# Patient Record
Sex: FEMALE | Race: WHITE | Employment: FULL TIME | URBAN - METROPOLITAN AREA
[De-identification: names, ages, dates, MRNs, and addresses within clinical notes are randomized per-mention and may not be internally consistent; named-entity substitution may affect disease eponyms.]

---

## 2018-04-24 ENCOUNTER — NURSE ONLY (OUTPATIENT)
Dept: FAMILY MEDICINE CLINIC | Age: 38
End: 2018-04-24
Payer: COMMERCIAL

## 2018-04-24 DIAGNOSIS — Z23 NEED FOR VACCINATION: Primary | ICD-10-CM

## 2018-04-24 PROCEDURE — 90746 HEPB VACCINE 3 DOSE ADULT IM: CPT | Performed by: FAMILY MEDICINE

## 2018-04-24 PROCEDURE — G0010 ADMIN HEPATITIS B VACCINE: HCPCS | Performed by: FAMILY MEDICINE

## 2018-05-16 ENCOUNTER — HOSPITAL ENCOUNTER (OUTPATIENT)
Dept: AUDIOLOGY | Age: 38
Discharge: HOME | End: 2018-05-16
Payer: COMMERCIAL

## 2018-05-16 PROCEDURE — 9990000010 HC NO CHARGE VISIT

## 2020-11-25 ENCOUNTER — HOSPITAL ENCOUNTER (OUTPATIENT)
Dept: AUDIOLOGY | Age: 40
Discharge: HOME | End: 2020-11-25
Payer: COMMERCIAL

## 2020-11-25 ENCOUNTER — OFFICE VISIT (OUTPATIENT)
Dept: ENT CLINIC | Age: 40
End: 2020-11-25
Payer: COMMERCIAL

## 2020-11-25 VITALS — TEMPERATURE: 97.8 F

## 2020-11-25 PROCEDURE — G8427 DOCREV CUR MEDS BY ELIG CLIN: HCPCS | Performed by: OTOLARYNGOLOGY

## 2020-11-25 PROCEDURE — 1036F TOBACCO NON-USER: CPT | Performed by: OTOLARYNGOLOGY

## 2020-11-25 PROCEDURE — 99204 OFFICE O/P NEW MOD 45 MIN: CPT | Performed by: OTOLARYNGOLOGY

## 2020-11-25 PROCEDURE — 69210 REMOVE IMPACTED EAR WAX UNI: CPT | Performed by: OTOLARYNGOLOGY

## 2020-11-25 PROCEDURE — G8484 FLU IMMUNIZE NO ADMIN: HCPCS | Performed by: OTOLARYNGOLOGY

## 2020-11-25 PROCEDURE — G8421 BMI NOT CALCULATED: HCPCS | Performed by: OTOLARYNGOLOGY

## 2020-11-25 PROCEDURE — 9990000010 HC NO CHARGE VISIT: Performed by: AUDIOLOGIST

## 2020-11-25 ASSESSMENT — ENCOUNTER SYMPTOMS
EYE DISCHARGE: 0
ABDOMINAL PAIN: 0
RESPIRATORY NEGATIVE: 1
CHEST TIGHTNESS: 0
EYES NEGATIVE: 1
APNEA: 0
COLOR CHANGE: 0
VOMITING: 0
SHORTNESS OF BREATH: 0
DIARRHEA: 0
GASTROINTESTINAL NEGATIVE: 1
EYE PAIN: 0

## 2020-11-25 NOTE — PROGRESS NOTES
Patient was here for hearing aid check. Patient reported needing a new ear hook on the left. Changed to new ear hook and changed tubing. No tubing cement was available, but tubing felt secure. Patient also reported she needs new ear molds and requested soft, hypoallergenic material, in clear. Told patient there may be a charge for the new ear molds, we would need to check with insurance. She said that's fine. Ear mold impressions taken bilaterally without incident. Patient was satisfied and will follow up when new ear molds come in. Will order new ear hooks for patient's hearing aids, as only one was left. Referral to ENT suggested due to cerumen impaction, on the left. A video  was used for this appointment.     Jose Still [facility]  [address] [facility] [id number]  Electronically signed by Jose Still on 11/25/2020 at 11:32 AM

## 2020-11-25 NOTE — PROGRESS NOTES
Subjective:      Patient ID:  Marivel Bahena is a 37 y.o. female. HPI:    Cerumen Impaction  Patient presents with diminished hearing both ears for the past 6 months. There is a prior history of cerumen impaction. The patient was using ear drops to loosen wax immediately prior to this visit. Hearing Loss  Pt has had hearing loss since 10months of age.   Pt signs with         Past Medical History:   Diagnosis Date    Allergic rhinitis     Dr. Karla Lombardo, allergy shots q 3 weeks    Deaf     since birth, Audiology at [facility], [facility]     Past Surgical History:   Procedure Laterality Date    TONSILLECTOMY      young child     Family History   Problem Relation Age of Onset    Kidney Disease Maternal Grandmother     Heart Failure Maternal Grandfather     Cancer Paternal Cousin         brain tumor     Social History     Socioeconomic History    Marital status: Single     Spouse name: None    Number of children: None    Years of education: None    Highest education level: None   Occupational History    Occupation: [employer]   Social Needs    Financial resource strain: None    Food insecurity     Worry: None     Inability: None    Transportation needs     Medical: None     Non-medical: None   Tobacco Use    Smoking status: Never Smoker    Smokeless tobacco: Never Used   Substance and Sexual Activity    Alcohol use: No    Drug use: No    Sexual activity: Never   Lifestyle    Physical activity     Days per week: None     Minutes per session: None    Stress: None   Relationships    Social connections     Talks on phone: None     Gets together: None     Attends Sabianist service: None     Active member of club or organization: None     Attends meetings of clubs or organizations: None     Relationship status: None    Intimate partner violence     Fear of current or ex partner: None     Emotionally abused: None     Physically abused: None     Forced sexual activity: None   Other Topics Concern  None   Social History Narrative    None     Allergies   Allergen Reactions    Codeine Other (See Comments)         Review of Systems   Constitutional: Negative. Negative for appetite change. HENT: Positive for hearing loss. Eyes: Negative. Negative for pain, discharge and visual disturbance. Respiratory: Negative. Negative for apnea, chest tightness and shortness of breath. Cardiovascular: Negative. Negative for chest pain, palpitations and leg swelling. Gastrointestinal: Negative. Negative for abdominal pain, diarrhea and vomiting. Endocrine: Negative for cold intolerance, heat intolerance and polydipsia. Genitourinary: Negative. Negative for dysuria, flank pain and hematuria. Musculoskeletal: Negative. Negative for arthralgias, gait problem and neck pain. Skin: Negative. Negative for color change, pallor and rash. Allergic/Immunologic: Negative for environmental allergies, food allergies and immunocompromised state. Neurological: Negative. Negative for dizziness, numbness and headaches. Hematological: Negative for adenopathy. Psychiatric/Behavioral: Negative. Negative for behavioral problems and hallucinations. All other systems reviewed and are negative. Objective:   Physical Exam  Vitals signs and nursing note reviewed. Constitutional:       Appearance: She is well-developed. HENT:      Head: Normocephalic and atraumatic. Right Ear: Decreased hearing noted. There is impacted cerumen. Left Ear: Decreased hearing noted. There is impacted cerumen. Nose: Nose normal.      Mouth/Throat:      Pharynx: Uvula midline. Eyes:      Conjunctiva/sclera: Conjunctivae normal.      Pupils: Pupils are equal, round, and reactive to light. Neck:      Musculoskeletal: Normal range of motion and neck supple. Cardiovascular:      Rate and Rhythm: Normal rate and regular rhythm. Heart sounds: Normal heart sounds.    Pulmonary:      Effort: Pulmonary effort is normal.      Breath sounds: Normal breath sounds. Abdominal:      General: Bowel sounds are normal.      Palpations: Abdomen is soft. Skin:     General: Skin is warm and dry. Neurological:      Mental Status: She is alert and oriented to person, place, and time. Cerumenremoval     Auditory canal(s) both ears completely obstructed with cerumen. A microscope was used due to deep impaction of the cerumen. Cerumen was gently removed using soft plastic curette. Tympanic membranes are intact following the procedure. Auditory canals appear normal.                       Assessment:       Diagnosis Orders   1. Bilateral impacted cerumen     2. Congenital hearing loss of both ears                Plan:      Cerumen impaction   Will follow up with patient in 6 months unless the patient has further issues.  Discussed H2O2 andirrigation bi-weekly for maintenance      Follow up in 6 month(s)

## 2021-01-05 ENCOUNTER — HOSPITAL ENCOUNTER (OUTPATIENT)
Dept: AUDIOLOGY | Age: 41
Discharge: HOME | End: 2021-01-05
Payer: COMMERCIAL

## 2021-01-05 PROCEDURE — 92651 AEP HEARING STATUS DETER I&R: CPT | Performed by: AUDIOLOGIST

## 2021-01-05 PROCEDURE — 92653 AEP NEURODIAGNOSTIC I&R: CPT | Performed by: AUDIOLOGIST

## 2021-01-05 PROCEDURE — 92652 AEP THRSHLD EST MLT FREQ I&R: CPT | Performed by: AUDIOLOGIST

## 2021-01-05 PROCEDURE — V5264 EAR MOLD/INSERT: HCPCS | Performed by: AUDIOLOGIST

## 2021-01-05 PROCEDURE — 92650 AEP SCR AUDITORY POTENTIAL: CPT | Performed by: AUDIOLOGIST

## 2021-01-05 NOTE — PROGRESS NOTES
Patient was here to  ear molds and ear hooks. Changed ear molds, patient was satisfied with fit and sound. Gave patient one extra ear hook, at her request. Patient was satisfied and will follow up as needed. [facility] video  used for this appointment.      Jose Ortega CCC-A  [address] [facility] [id number]  Electronically signed by Jose Ortega on 1/5/2021 at 10:48 AM

## 2021-04-29 ENCOUNTER — OFFICE VISIT (OUTPATIENT)
Dept: FAMILY MEDICINE CLINIC | Age: 41
End: 2021-04-29
Payer: COMMERCIAL

## 2021-04-29 VITALS
OXYGEN SATURATION: 98 % | HEART RATE: 83 BPM | HEIGHT: 67 IN | RESPIRATION RATE: 16 BRPM | SYSTOLIC BLOOD PRESSURE: 130 MMHG | TEMPERATURE: 98.1 F | DIASTOLIC BLOOD PRESSURE: 80 MMHG | BODY MASS INDEX: 27.94 KG/M2 | WEIGHT: 178 LBS

## 2021-04-29 DIAGNOSIS — M25.531 RIGHT WRIST PAIN: ICD-10-CM

## 2021-04-29 DIAGNOSIS — M25.562 CHRONIC PAIN OF LEFT KNEE: ICD-10-CM

## 2021-04-29 DIAGNOSIS — G89.29 CHRONIC PAIN OF LEFT KNEE: ICD-10-CM

## 2021-04-29 DIAGNOSIS — B35.1 ONYCHOMYCOSIS: ICD-10-CM

## 2021-04-29 DIAGNOSIS — R10.11 RUQ PAIN: ICD-10-CM

## 2021-04-29 DIAGNOSIS — L30.9 ECZEMA, UNSPECIFIED TYPE: Primary | ICD-10-CM

## 2021-04-29 PROCEDURE — G8419 CALC BMI OUT NRM PARAM NOF/U: HCPCS | Performed by: FAMILY MEDICINE

## 2021-04-29 PROCEDURE — G8427 DOCREV CUR MEDS BY ELIG CLIN: HCPCS | Performed by: FAMILY MEDICINE

## 2021-04-29 PROCEDURE — 1036F TOBACCO NON-USER: CPT | Performed by: FAMILY MEDICINE

## 2021-04-29 PROCEDURE — 99214 OFFICE O/P EST MOD 30 MIN: CPT | Performed by: FAMILY MEDICINE

## 2021-04-29 RX ORDER — TRIAMCINOLONE ACETONIDE 1 MG/G
CREAM TOPICAL
Qty: 80 G | Refills: 1 | Status: SHIPPED
Start: 2021-04-29 | End: 2021-05-26 | Stop reason: SDUPTHER

## 2021-04-29 RX ORDER — NORETHINDRONE ACETATE/ETHINYL ESTRADIOL AND FERROUS FUMARATE 1.5-30(21)
KIT ORAL
COMMUNITY
Start: 2021-04-06

## 2021-04-29 RX ORDER — TRIAMCINOLONE ACETONIDE 1 MG/G
CREAM TOPICAL
COMMUNITY
Start: 2021-04-20

## 2021-04-29 RX ORDER — NORETHINDRONE ACETATE AND ETHINYL ESTRADIOL 1.5-30(21)
KIT ORAL
COMMUNITY
Start: 2021-04-06 | End: 2021-05-26 | Stop reason: SDUPTHER

## 2021-04-29 ASSESSMENT — PATIENT HEALTH QUESTIONNAIRE - PHQ9
SUM OF ALL RESPONSES TO PHQ QUESTIONS 1-9: 0
1. LITTLE INTEREST OR PLEASURE IN DOING THINGS: 0

## 2021-04-29 NOTE — PROGRESS NOTES
5/19/2021    Chief Complaint   Patient presents with    Rash     since march itchy might be from gloves at work        HPI    Rhett Rivera is a 41 y.o. patient that presents today for:    Dermatitis: Patient complains of a rash. Symptoms began several months ago. Patient describes the rash as lichenified. Patient's previous dermatologic history:yes. Medications currently using: none. Environmental exposures or allergies: none     Onychomycosis: Patient complains of abnormal appearing toenails. Symptoms have been ongoing for about several years, and include increasing diameter, increasing thickness, none. Previous treatment has included none, with inadequate improvement. Known liver disease? no.    Knee Pain: Patient presents with a knee injury involving the  left knee. Onset of the symptoms was several years ago. Inciting event: injured while a teenager. Current symptoms include giving out and stiffness. Pain is aggravated by going up and down stairs. Patient has had prior knee problems. Evaluation to date: none. Treatment to date: avoidance of offending activity. Wrist Pain: Patient complaints of right wrist pain. This is evaluated as a personal injury. The pain began several years ago. The pain is located primarily in the globally. She describes the symptoms as sharp. Symptoms improve with rest, wrist brace. The symptoms are worse with use of injured area. The patient  does not have neck pain. The patient is active in none. Treatment to date has been NSAID's, without significant relief. Abdominal pain:  Patient is here today with complaints of abdominal pain. This has been going on for many months . Pain is aching in nature. Pain is  radiating. Associated signs and symptoms include positive for - abdominal pain. Patient does not have a change in appetite. Patient is  drinking well. Recent travel No.  Patient does not have blood in stool. Patient has not had a colonoscopy.   Previous abdominal surgeries: No. Patient does not have genital complaints. Patient does not urinary complaints. Patient's past medical, surgical, social and/or family history reviewed, updated in chart, and are non-contributory (unless otherwise stated). Medications and allergies also reviewed and updated in chart. ROS negative unless otherwise specified    Physical Exam  Temp Readings from Last 3 Encounters:   04/29/21 98.1 °F (36.7 °C)   11/25/20 97.8 °F (36.6 °C) (Infrared)   12/27/17 98 °F (36.7 °C) (Oral)     Wt Readings from Last 3 Encounters:   04/29/21 178 lb (80.7 kg)   12/27/17 157 lb 6.4 oz (71.4 kg)   10/23/17 161 lb (73 kg)     BP Readings from Last 3 Encounters:   04/29/21 130/80   12/27/17 130/80   10/23/17 128/80     Pulse Readings from Last 3 Encounters:   04/29/21 83   12/27/17 76   10/23/17 78       General appearance: alert, well appearing, and in no distress, oriented to person, place, and time and normal appearing weight. CVS exam: normal rate, regular rhythm, normal S1, S2, no murmurs, rubs, clicks or gallops. Radial pulses 2+ bilateral.  PT/DP pulse 2+ bilat. No C/C/E    Chest: clear to auscultation, no wheezes, rales or rhonchi, symmetric air entry. Abdomen: Soft, non-tender, non-distended, positive BS in all 4 quadrants    Extremities:Dorsalis pedis pulses palpated bilaterally, no clubbing, cyanosis, edema or erythema,     SKIN: no lesions, jaundice, petechiae, pallor, cyanosis, ecchymosis    NEURO: gross motor exam normal by observation, gait normal    Mental status - alert, oriented to person, place, and time, normal mood, behavior, speech, dress, motor activity, and thought processes      Assessment/Plan  Ashley was seen today for rash. Diagnoses and all orders for this visit:    Eczema, unspecified type  -     triamcinolone (KENALOG) 0.1 % cream; Apply topically 2 times daily. Onychomycosis  -     Discontinue: ciclopirox (PENLAC) 8 % solution;  Apply topically

## 2021-05-04 DIAGNOSIS — B35.1 ONYCHOMYCOSIS: ICD-10-CM

## 2021-05-18 ENCOUNTER — HOSPITAL ENCOUNTER (OUTPATIENT)
Dept: ULTRASOUND IMAGING | Age: 41
Discharge: HOME | End: 2021-05-20
Payer: COMMERCIAL

## 2021-05-18 ENCOUNTER — HOSPITAL ENCOUNTER (OUTPATIENT)
Age: 41
Discharge: HOME | End: 2021-05-20
Payer: COMMERCIAL

## 2021-05-18 ENCOUNTER — HOSPITAL ENCOUNTER (OUTPATIENT)
Dept: GENERAL RADIOLOGY | Age: 41
Discharge: HOME | End: 2021-05-20
Payer: COMMERCIAL

## 2021-05-18 DIAGNOSIS — M25.562 CHRONIC PAIN OF LEFT KNEE: ICD-10-CM

## 2021-05-18 DIAGNOSIS — R10.11 RUQ PAIN: ICD-10-CM

## 2021-05-18 DIAGNOSIS — G89.29 CHRONIC PAIN OF LEFT KNEE: ICD-10-CM

## 2021-05-18 PROCEDURE — 76705 ECHO EXAM OF ABDOMEN: CPT

## 2021-05-18 PROCEDURE — 73562 X-RAY EXAM OF KNEE 3: CPT

## 2021-05-24 ENCOUNTER — TELEPHONE (OUTPATIENT)
Dept: FAMILY MEDICINE CLINIC | Age: 41
End: 2021-05-24

## 2021-05-26 ENCOUNTER — OFFICE VISIT (OUTPATIENT)
Dept: ENT CLINIC | Age: 41
End: 2021-05-26
Payer: COMMERCIAL

## 2021-05-26 VITALS
DIASTOLIC BLOOD PRESSURE: 85 MMHG | SYSTOLIC BLOOD PRESSURE: 141 MMHG | HEART RATE: 66 BPM | BODY MASS INDEX: 27 KG/M2 | WEIGHT: 172 LBS | HEIGHT: 67 IN

## 2021-05-26 DIAGNOSIS — H90.5 CONGENITAL HEARING LOSS OF BOTH EARS: ICD-10-CM

## 2021-05-26 DIAGNOSIS — H61.23 BILATERAL IMPACTED CERUMEN: Primary | ICD-10-CM

## 2021-05-26 PROCEDURE — 69210 REMOVE IMPACTED EAR WAX UNI: CPT | Performed by: OTOLARYNGOLOGY

## 2021-05-26 PROCEDURE — 1036F TOBACCO NON-USER: CPT | Performed by: OTOLARYNGOLOGY

## 2021-05-26 PROCEDURE — G8428 CUR MEDS NOT DOCUMENT: HCPCS | Performed by: OTOLARYNGOLOGY

## 2021-05-26 PROCEDURE — G8419 CALC BMI OUT NRM PARAM NOF/U: HCPCS | Performed by: OTOLARYNGOLOGY

## 2021-05-26 ASSESSMENT — ENCOUNTER SYMPTOMS
SHORTNESS OF BREATH: 0
APNEA: 0
DIARRHEA: 0
CHEST TIGHTNESS: 0
RESPIRATORY NEGATIVE: 1
ABDOMINAL PAIN: 0
VOMITING: 0
COLOR CHANGE: 0
EYE DISCHARGE: 0
GASTROINTESTINAL NEGATIVE: 1
EYES NEGATIVE: 1
EYE PAIN: 0

## 2021-05-26 NOTE — PROGRESS NOTES
Attends Jew Services:     Active Member of Clubs or Organizations:     Attends Club or Organization Meetings:     Marital Status:    Intimate Partner Violence:     Fear of Current or Ex-Partner:     Emotionally Abused:     Physically Abused:     Sexually Abused: Allergies   Allergen Reactions    Codeine Other (See Comments)         Review of Systems   Constitutional: Negative. Negative for appetite change. HENT: Positive for hearing loss. Eyes: Negative. Negative for pain, discharge and visual disturbance. Respiratory: Negative. Negative for apnea, chest tightness and shortness of breath. Cardiovascular: Negative. Negative for chest pain, palpitations and leg swelling. Gastrointestinal: Negative. Negative for abdominal pain, diarrhea and vomiting. Endocrine: Negative for cold intolerance, heat intolerance and polydipsia. Genitourinary: Negative. Negative for dysuria, flank pain and hematuria. Musculoskeletal: Negative. Negative for arthralgias, gait problem and neck pain. Skin: Negative. Negative for color change, pallor and rash. Allergic/Immunologic: Negative for environmental allergies, food allergies and immunocompromised state. Neurological: Negative. Negative for dizziness, numbness and headaches. Hematological: Negative for adenopathy. Psychiatric/Behavioral: Negative. Negative for behavioral problems and hallucinations. All other systems reviewed and are negative. Objective:   Physical Exam  Vitals and nursing note reviewed. Constitutional:       Appearance: She is well-developed. HENT:      Head: Normocephalic and atraumatic. Right Ear: Decreased hearing noted. There is impacted cerumen. Left Ear: Decreased hearing noted. There is impacted cerumen. Nose: Nose normal.      Mouth/Throat:      Pharynx: Uvula midline.    Eyes:      Conjunctiva/sclera: Conjunctivae normal.      Pupils: Pupils are equal, round, and reactive to light. Cardiovascular:      Rate and Rhythm: Normal rate and regular rhythm. Heart sounds: Normal heart sounds. Pulmonary:      Effort: Pulmonary effort is normal.      Breath sounds: Normal breath sounds. Abdominal:      General: Bowel sounds are normal.      Palpations: Abdomen is soft. Musculoskeletal:      Cervical back: Normal range of motion and neck supple. Skin:     General: Skin is warm and dry. Neurological:      Mental Status: She is alert and oriented to person, place, and time. Cerumenremoval     Auditory canal(s) both ears completely obstructed with cerumen. A microscope was not used . Cerumen was gently removed using soft plastic curette. Tympanic membranes are intact following the procedure. Auditory canals appear normal.                       Assessment:       Diagnosis Orders   1. Bilateral impacted cerumen     2. Congenital hearing loss of both ears                Plan:      Cerumen impaction   Will follow up with patient in 6 months unless the patient has further issues. Discussed H2O2 andirrigation bi-weekly for maintenance      Follow up in 6 month(s)                  Ahsley Huang  1980    I have discussed the case, including pertinent history and exam findings with the resident. I have seen and examined the patient and the key elements of the encounter have been performed by me. I agree with the assessment, plan and orders as documented by the  resident              Remainder of medical problems as per  resident note. Patient seen and examined. Agree with above exam, assessment and plan.       Electronically signed by Dajuan Perkins DO on 6/1/21 at 9:54 AM EDT

## 2021-07-03 ENCOUNTER — HOSPITAL ENCOUNTER (EMERGENCY)
Age: 41
Discharge: ANOTHER HOSPITAL | End: 2021-07-03
Attending: FAMILY MEDICINE
Payer: COMMERCIAL

## 2021-07-03 ENCOUNTER — ANESTHESIA EVENT (OUTPATIENT)
Dept: OPERATING ROOM | Age: 41
DRG: 419 | End: 2021-07-03
Payer: COMMERCIAL

## 2021-07-03 ENCOUNTER — HOSPITAL ENCOUNTER (INPATIENT)
Age: 41
LOS: 1 days | Discharge: HOME | DRG: 419 | End: 2021-07-04
Attending: SURGERY | Admitting: SURGERY
Payer: COMMERCIAL

## 2021-07-03 ENCOUNTER — APPOINTMENT (OUTPATIENT)
Dept: CT IMAGING | Age: 41
End: 2021-07-03
Payer: COMMERCIAL

## 2021-07-03 ENCOUNTER — ANESTHESIA (OUTPATIENT)
Dept: OPERATING ROOM | Age: 41
DRG: 419 | End: 2021-07-03
Payer: COMMERCIAL

## 2021-07-03 VITALS
OXYGEN SATURATION: 97 % | SYSTOLIC BLOOD PRESSURE: 162 MMHG | TEMPERATURE: 97.5 F | BODY MASS INDEX: 27.64 KG/M2 | HEIGHT: 66 IN | RESPIRATION RATE: 16 BRPM | DIASTOLIC BLOOD PRESSURE: 90 MMHG | WEIGHT: 172 LBS | HEART RATE: 68 BPM

## 2021-07-03 VITALS — OXYGEN SATURATION: 95 % | SYSTOLIC BLOOD PRESSURE: 155 MMHG | TEMPERATURE: 97.2 F | DIASTOLIC BLOOD PRESSURE: 83 MMHG

## 2021-07-03 DIAGNOSIS — K81.0 ACUTE CHOLECYSTITIS: Primary | ICD-10-CM

## 2021-07-03 DIAGNOSIS — G89.18 POST-OP PAIN: ICD-10-CM

## 2021-07-03 LAB
ALBUMIN SERPL-MCNC: 4.3 G/DL (ref 3.5–5.2)
ALP BLD-CCNC: 83 U/L (ref 35–104)
ALT SERPL-CCNC: 20 U/L (ref 0–32)
ANION GAP SERPL CALCULATED.3IONS-SCNC: 9 MMOL/L (ref 7–16)
AST SERPL-CCNC: 12 U/L (ref 0–31)
BACTERIA: ABNORMAL /HPF
BASOPHILS ABSOLUTE: 0.03 E9/L (ref 0–0.2)
BASOPHILS RELATIVE PERCENT: 0.3 % (ref 0–2)
BILIRUB SERPL-MCNC: 0.2 MG/DL (ref 0–1.2)
BILIRUBIN URINE: NEGATIVE
BLOOD, URINE: ABNORMAL
BUN BLDV-MCNC: 12 MG/DL (ref 6–20)
CALCIUM SERPL-MCNC: 9.5 MG/DL (ref 8.6–10.2)
CHLORIDE BLD-SCNC: 108 MMOL/L (ref 98–107)
CLARITY: ABNORMAL
CO2: 22 MMOL/L (ref 22–29)
COLOR: YELLOW
CREAT SERPL-MCNC: 0.8 MG/DL (ref 0.5–1)
EOSINOPHILS ABSOLUTE: 0.02 E9/L (ref 0.05–0.5)
EOSINOPHILS RELATIVE PERCENT: 0.2 % (ref 0–6)
EPITHELIAL CELLS, UA: ABNORMAL /HPF
GFR AFRICAN AMERICAN: >60
GFR NON-AFRICAN AMERICAN: >60 ML/MIN/1.73
GLUCOSE BLD-MCNC: 131 MG/DL (ref 74–99)
GLUCOSE URINE: NEGATIVE MG/DL
HCG(URINE) PREGNANCY TEST: NEGATIVE
HCT VFR BLD CALC: 46.5 % (ref 34–48)
HEMOGLOBIN: 15.3 G/DL (ref 11.5–15.5)
IMMATURE GRANULOCYTES #: 0.02 E9/L
IMMATURE GRANULOCYTES %: 0.2 % (ref 0–5)
KETONES, URINE: NEGATIVE MG/DL
LACTIC ACID: 1.7 MMOL/L (ref 0.5–2.2)
LEUKOCYTE ESTERASE, URINE: NEGATIVE
LIPASE: 20 U/L (ref 13–60)
LYMPHOCYTES ABSOLUTE: 1.01 E9/L (ref 1.5–4)
LYMPHOCYTES RELATIVE PERCENT: 11.2 % (ref 20–42)
MCH RBC QN AUTO: 28.8 PG (ref 26–35)
MCHC RBC AUTO-ENTMCNC: 32.9 % (ref 32–34.5)
MCV RBC AUTO: 87.6 FL (ref 80–99.9)
MONOCYTES ABSOLUTE: 0.36 E9/L (ref 0.1–0.95)
MONOCYTES RELATIVE PERCENT: 4 % (ref 2–12)
NEUTROPHILS ABSOLUTE: 7.57 E9/L (ref 1.8–7.3)
NEUTROPHILS RELATIVE PERCENT: 84.1 % (ref 43–80)
NITRITE, URINE: NEGATIVE
PDW BLD-RTO: 12.9 FL (ref 11.5–15)
PH UA: 6.5 (ref 5–9)
PLATELET # BLD: 267 E9/L (ref 130–450)
PMV BLD AUTO: 9.5 FL (ref 7–12)
POTASSIUM SERPL-SCNC: 5 MMOL/L (ref 3.5–5)
PROTEIN UA: NEGATIVE MG/DL
RBC # BLD: 5.31 E12/L (ref 3.5–5.5)
RBC UA: ABNORMAL /HPF (ref 0–2)
SODIUM BLD-SCNC: 139 MMOL/L (ref 132–146)
SPECIFIC GRAVITY UA: 1.02 (ref 1–1.03)
TOTAL PROTEIN: 7.6 G/DL (ref 6.4–8.3)
UROBILINOGEN, URINE: 0.2 E.U./DL
WBC # BLD: 9 E9/L (ref 4.5–11.5)
WBC UA: ABNORMAL /HPF (ref 0–5)

## 2021-07-03 PROCEDURE — 99223 1ST HOSP IP/OBS HIGH 75: CPT | Performed by: SURGERY

## 2021-07-03 PROCEDURE — 6360000002 HC RX W HCPCS: Performed by: FAMILY MEDICINE

## 2021-07-03 PROCEDURE — 3700000001 HC ADD 15 MINUTES (ANESTHESIA): Performed by: SURGERY

## 2021-07-03 PROCEDURE — 2500000003 HC RX 250 WO HCPCS: Performed by: FAMILY MEDICINE

## 2021-07-03 PROCEDURE — 6360000002 HC RX W HCPCS: Performed by: ANESTHESIOLOGY

## 2021-07-03 PROCEDURE — 7100000001 HC PACU RECOVERY - ADDTL 15 MIN: Performed by: SURGERY

## 2021-07-03 PROCEDURE — 96375 TX/PRO/DX INJ NEW DRUG ADDON: CPT

## 2021-07-03 PROCEDURE — 3600000019 HC SURGERY ROBOT ADDTL 15MIN: Performed by: SURGERY

## 2021-07-03 PROCEDURE — 2709999900 HC NON-CHARGEABLE SUPPLY: Performed by: SURGERY

## 2021-07-03 PROCEDURE — S2900 ROBOTIC SURGICAL SYSTEM: HCPCS | Performed by: SURGERY

## 2021-07-03 PROCEDURE — 2580000003 HC RX 258: Performed by: FAMILY MEDICINE

## 2021-07-03 PROCEDURE — 6370000000 HC RX 637 (ALT 250 FOR IP): Performed by: SURGERY

## 2021-07-03 PROCEDURE — 36415 COLL VENOUS BLD VENIPUNCTURE: CPT

## 2021-07-03 PROCEDURE — 2720000010 HC SURG SUPPLY STERILE: Performed by: SURGERY

## 2021-07-03 PROCEDURE — 99284 EMERGENCY DEPT VISIT MOD MDM: CPT

## 2021-07-03 PROCEDURE — 2580000003 HC RX 258: Performed by: SURGERY

## 2021-07-03 PROCEDURE — 0FT44ZZ RESECTION OF GALLBLADDER, PERCUTANEOUS ENDOSCOPIC APPROACH: ICD-10-PCS | Performed by: SURGERY

## 2021-07-03 PROCEDURE — 2500000003 HC RX 250 WO HCPCS: Performed by: SURGERY

## 2021-07-03 PROCEDURE — 88304 TISSUE EXAM BY PATHOLOGIST: CPT

## 2021-07-03 PROCEDURE — 3700000000 HC ANESTHESIA ATTENDED CARE: Performed by: SURGERY

## 2021-07-03 PROCEDURE — 6360000002 HC RX W HCPCS: Performed by: NURSE ANESTHETIST, CERTIFIED REGISTERED

## 2021-07-03 PROCEDURE — 7100000000 HC PACU RECOVERY - FIRST 15 MIN: Performed by: SURGERY

## 2021-07-03 PROCEDURE — 83605 ASSAY OF LACTIC ACID: CPT

## 2021-07-03 PROCEDURE — 6360000002 HC RX W HCPCS: Performed by: SURGERY

## 2021-07-03 PROCEDURE — 83690 ASSAY OF LIPASE: CPT

## 2021-07-03 PROCEDURE — 2500000003 HC RX 250 WO HCPCS: Performed by: NURSE ANESTHETIST, CERTIFIED REGISTERED

## 2021-07-03 PROCEDURE — 81025 URINE PREGNANCY TEST: CPT

## 2021-07-03 PROCEDURE — 2580000003 HC RX 258: Performed by: NURSE ANESTHETIST, CERTIFIED REGISTERED

## 2021-07-03 PROCEDURE — 8E0W4CZ ROBOTIC ASSISTED PROCEDURE OF TRUNK REGION, PERCUTANEOUS ENDOSCOPIC APPROACH: ICD-10-PCS | Performed by: SURGERY

## 2021-07-03 PROCEDURE — 85025 COMPLETE CBC W/AUTO DIFF WBC: CPT

## 2021-07-03 PROCEDURE — 96376 TX/PRO/DX INJ SAME DRUG ADON: CPT

## 2021-07-03 PROCEDURE — 96374 THER/PROPH/DIAG INJ IV PUSH: CPT

## 2021-07-03 PROCEDURE — 1200000000 HC SEMI PRIVATE

## 2021-07-03 PROCEDURE — 96365 THER/PROPH/DIAG IV INF INIT: CPT

## 2021-07-03 PROCEDURE — 74176 CT ABD & PELVIS W/O CONTRAST: CPT

## 2021-07-03 PROCEDURE — 3600000009 HC SURGERY ROBOT BASE: Performed by: SURGERY

## 2021-07-03 PROCEDURE — 81001 URINALYSIS AUTO W/SCOPE: CPT

## 2021-07-03 PROCEDURE — 80053 COMPREHEN METABOLIC PANEL: CPT

## 2021-07-03 PROCEDURE — 47562 LAPAROSCOPIC CHOLECYSTECTOMY: CPT | Performed by: SURGERY

## 2021-07-03 RX ORDER — OXYCODONE HYDROCHLORIDE AND ACETAMINOPHEN 5; 325 MG/1; MG/1
1 TABLET ORAL EVERY 4 HOURS PRN
Status: DISCONTINUED | OUTPATIENT
Start: 2021-07-03 | End: 2021-07-04 | Stop reason: HOSPADM

## 2021-07-03 RX ORDER — MEPERIDINE HYDROCHLORIDE 25 MG/ML
12.5 INJECTION INTRAMUSCULAR; INTRAVENOUS; SUBCUTANEOUS EVERY 5 MIN PRN
Status: DISCONTINUED | OUTPATIENT
Start: 2021-07-03 | End: 2021-07-03

## 2021-07-03 RX ORDER — FENTANYL CITRATE 50 UG/ML
50 INJECTION, SOLUTION INTRAMUSCULAR; INTRAVENOUS ONCE
Status: COMPLETED | OUTPATIENT
Start: 2021-07-03 | End: 2021-07-03

## 2021-07-03 RX ORDER — 0.9 % SODIUM CHLORIDE 0.9 %
1000 INTRAVENOUS SOLUTION INTRAVENOUS ONCE
Status: COMPLETED | OUTPATIENT
Start: 2021-07-03 | End: 2021-07-03

## 2021-07-03 RX ORDER — NEOSTIGMINE METHYLSULFATE 1 MG/ML
INJECTION, SOLUTION INTRAVENOUS PRN
Status: DISCONTINUED | OUTPATIENT
Start: 2021-07-03 | End: 2021-07-03 | Stop reason: SDUPTHER

## 2021-07-03 RX ORDER — ONDANSETRON 2 MG/ML
4 INJECTION INTRAMUSCULAR; INTRAVENOUS EVERY 6 HOURS PRN
Status: DISCONTINUED | OUTPATIENT
Start: 2021-07-03 | End: 2021-07-04 | Stop reason: HOSPADM

## 2021-07-03 RX ORDER — ATROPINE SULFATE 0.4 MG/ML
AMPUL (ML) INJECTION PRN
Status: DISCONTINUED | OUTPATIENT
Start: 2021-07-03 | End: 2021-07-03 | Stop reason: SDUPTHER

## 2021-07-03 RX ORDER — OXYCODONE HYDROCHLORIDE AND ACETAMINOPHEN 5; 325 MG/1; MG/1
2 TABLET ORAL EVERY 4 HOURS PRN
Status: DISCONTINUED | OUTPATIENT
Start: 2021-07-03 | End: 2021-07-04 | Stop reason: HOSPADM

## 2021-07-03 RX ORDER — BUPIVACAINE HYDROCHLORIDE AND EPINEPHRINE 2.5; 5 MG/ML; UG/ML
INJECTION, SOLUTION EPIDURAL; INFILTRATION; INTRACAUDAL; PERINEURAL PRN
Status: DISCONTINUED | OUTPATIENT
Start: 2021-07-03 | End: 2021-07-03 | Stop reason: ALTCHOICE

## 2021-07-03 RX ORDER — ONDANSETRON 4 MG/1
4 TABLET, ORALLY DISINTEGRATING ORAL EVERY 8 HOURS PRN
Status: DISCONTINUED | OUTPATIENT
Start: 2021-07-03 | End: 2021-07-04 | Stop reason: HOSPADM

## 2021-07-03 RX ORDER — INDOCYANINE GREEN AND WATER 25 MG
5 KIT INJECTION SEE ADMIN INSTRUCTIONS
Status: DISCONTINUED | OUTPATIENT
Start: 2021-07-03 | End: 2021-07-04 | Stop reason: HOSPADM

## 2021-07-03 RX ORDER — ONDANSETRON 2 MG/ML
INJECTION INTRAMUSCULAR; INTRAVENOUS PRN
Status: DISCONTINUED | OUTPATIENT
Start: 2021-07-03 | End: 2021-07-03 | Stop reason: SDUPTHER

## 2021-07-03 RX ORDER — DEXAMETHASONE SODIUM PHOSPHATE 4 MG/ML
INJECTION, SOLUTION INTRA-ARTICULAR; INTRALESIONAL; INTRAMUSCULAR; INTRAVENOUS; SOFT TISSUE PRN
Status: DISCONTINUED | OUTPATIENT
Start: 2021-07-03 | End: 2021-07-03 | Stop reason: SDUPTHER

## 2021-07-03 RX ORDER — SODIUM CHLORIDE 9 MG/ML
INJECTION, SOLUTION INTRAVENOUS CONTINUOUS PRN
Status: DISCONTINUED | OUTPATIENT
Start: 2021-07-03 | End: 2021-07-03 | Stop reason: SDUPTHER

## 2021-07-03 RX ORDER — DIPHENHYDRAMINE HYDROCHLORIDE 50 MG/ML
12.5 INJECTION INTRAMUSCULAR; INTRAVENOUS
Status: DISCONTINUED | OUTPATIENT
Start: 2021-07-03 | End: 2021-07-03

## 2021-07-03 RX ORDER — SODIUM CHLORIDE 0.9 % (FLUSH) 0.9 %
5-40 SYRINGE (ML) INJECTION EVERY 12 HOURS SCHEDULED
Status: DISCONTINUED | OUTPATIENT
Start: 2021-07-03 | End: 2021-07-04 | Stop reason: HOSPADM

## 2021-07-03 RX ORDER — SODIUM CHLORIDE 0.9 % (FLUSH) 0.9 %
5-40 SYRINGE (ML) INJECTION PRN
Status: DISCONTINUED | OUTPATIENT
Start: 2021-07-03 | End: 2021-07-04 | Stop reason: HOSPADM

## 2021-07-03 RX ORDER — ROCURONIUM BROMIDE 10 MG/ML
INJECTION, SOLUTION INTRAVENOUS PRN
Status: DISCONTINUED | OUTPATIENT
Start: 2021-07-03 | End: 2021-07-03 | Stop reason: SDUPTHER

## 2021-07-03 RX ORDER — PROPOFOL 10 MG/ML
INJECTION, EMULSION INTRAVENOUS PRN
Status: DISCONTINUED | OUTPATIENT
Start: 2021-07-03 | End: 2021-07-03 | Stop reason: SDUPTHER

## 2021-07-03 RX ORDER — SODIUM CHLORIDE 9 MG/ML
25 INJECTION, SOLUTION INTRAVENOUS PRN
Status: DISCONTINUED | OUTPATIENT
Start: 2021-07-03 | End: 2021-07-04 | Stop reason: HOSPADM

## 2021-07-03 RX ORDER — ONDANSETRON 2 MG/ML
4 INJECTION INTRAMUSCULAR; INTRAVENOUS ONCE
Status: COMPLETED | OUTPATIENT
Start: 2021-07-03 | End: 2021-07-03

## 2021-07-03 RX ORDER — FENTANYL CITRATE 50 UG/ML
INJECTION, SOLUTION INTRAMUSCULAR; INTRAVENOUS PRN
Status: DISCONTINUED | OUTPATIENT
Start: 2021-07-03 | End: 2021-07-03 | Stop reason: SDUPTHER

## 2021-07-03 RX ORDER — MIDAZOLAM HYDROCHLORIDE 1 MG/ML
INJECTION INTRAMUSCULAR; INTRAVENOUS PRN
Status: DISCONTINUED | OUTPATIENT
Start: 2021-07-03 | End: 2021-07-03 | Stop reason: SDUPTHER

## 2021-07-03 RX ORDER — SODIUM CHLORIDE, SODIUM LACTATE, POTASSIUM CHLORIDE, CALCIUM CHLORIDE 600; 310; 30; 20 MG/100ML; MG/100ML; MG/100ML; MG/100ML
INJECTION, SOLUTION INTRAVENOUS CONTINUOUS
Status: DISCONTINUED | OUTPATIENT
Start: 2021-07-03 | End: 2021-07-04 | Stop reason: HOSPADM

## 2021-07-03 RX ADMIN — OXYCODONE HYDROCHLORIDE AND ACETAMINOPHEN 1 TABLET: 5; 325 TABLET ORAL at 21:41

## 2021-07-03 RX ADMIN — ROCURONIUM BROMIDE 10 MG: 10 INJECTION, SOLUTION INTRAVENOUS at 12:05

## 2021-07-03 RX ADMIN — ROCURONIUM BROMIDE 40 MG: 10 INJECTION, SOLUTION INTRAVENOUS at 11:19

## 2021-07-03 RX ADMIN — FAMOTIDINE 20 MG: 10 INJECTION, SOLUTION INTRAVENOUS at 21:41

## 2021-07-03 RX ADMIN — SODIUM CHLORIDE 1000 ML: 9 INJECTION, SOLUTION INTRAVENOUS at 07:16

## 2021-07-03 RX ADMIN — ATROPINE SULFATE 0.6 MG: 0.4 INJECTION, SOLUTION INTRAMUSCULAR; INTRAVENOUS; SUBCUTANEOUS at 12:32

## 2021-07-03 RX ADMIN — FENTANYL CITRATE 50 MCG: 50 INJECTION, SOLUTION INTRAMUSCULAR; INTRAVENOUS at 12:05

## 2021-07-03 RX ADMIN — FENTANYL CITRATE 50 MCG: 0.05 INJECTION, SOLUTION INTRAMUSCULAR; INTRAVENOUS at 07:16

## 2021-07-03 RX ADMIN — HYDROMORPHONE HYDROCHLORIDE 0.5 MG: 1 INJECTION, SOLUTION INTRAMUSCULAR; INTRAVENOUS; SUBCUTANEOUS at 14:41

## 2021-07-03 RX ADMIN — ONDANSETRON 4 MG: 2 INJECTION INTRAMUSCULAR; INTRAVENOUS at 07:17

## 2021-07-03 RX ADMIN — ONDANSETRON 4 MG: 2 INJECTION INTRAMUSCULAR; INTRAVENOUS at 11:19

## 2021-07-03 RX ADMIN — DEXAMETHASONE SODIUM PHOSPHATE 8 MG: 4 INJECTION, SOLUTION INTRAMUSCULAR; INTRAVENOUS at 11:19

## 2021-07-03 RX ADMIN — METRONIDAZOLE 500 MG: 500 INJECTION, SOLUTION INTRAVENOUS at 17:41

## 2021-07-03 RX ADMIN — FENTANYL CITRATE 100 MCG: 50 INJECTION, SOLUTION INTRAMUSCULAR; INTRAVENOUS at 11:19

## 2021-07-03 RX ADMIN — Medication 10 ML: at 21:44

## 2021-07-03 RX ADMIN — SODIUM CHLORIDE: 9 INJECTION, SOLUTION INTRAVENOUS at 12:41

## 2021-07-03 RX ADMIN — ONDANSETRON 4 MG: 4 TABLET, ORALLY DISINTEGRATING ORAL at 17:45

## 2021-07-03 RX ADMIN — OXYCODONE HYDROCHLORIDE AND ACETAMINOPHEN 1 TABLET: 5; 325 TABLET ORAL at 17:45

## 2021-07-03 RX ADMIN — MIDAZOLAM 2 MG: 1 INJECTION INTRAMUSCULAR; INTRAVENOUS at 11:13

## 2021-07-03 RX ADMIN — METRONIDAZOLE 500 MG: 500 INJECTION, SOLUTION INTRAVENOUS at 08:59

## 2021-07-03 RX ADMIN — HYDROMORPHONE HYDROCHLORIDE 0.5 MG: 1 INJECTION, SOLUTION INTRAMUSCULAR; INTRAVENOUS; SUBCUTANEOUS at 13:05

## 2021-07-03 RX ADMIN — FENTANYL CITRATE 50 MCG: 0.05 INJECTION, SOLUTION INTRAMUSCULAR; INTRAVENOUS at 08:05

## 2021-07-03 RX ADMIN — CEFTRIAXONE SODIUM 2000 MG: 2 INJECTION, POWDER, FOR SOLUTION INTRAMUSCULAR; INTRAVENOUS at 08:40

## 2021-07-03 RX ADMIN — SODIUM CHLORIDE: 9 INJECTION, SOLUTION INTRAVENOUS at 11:14

## 2021-07-03 RX ADMIN — PROPOFOL 150 MG: 10 INJECTION, EMULSION INTRAVENOUS at 11:19

## 2021-07-03 RX ADMIN — HYDROMORPHONE HYDROCHLORIDE 0.5 MG: 1 INJECTION, SOLUTION INTRAMUSCULAR; INTRAVENOUS; SUBCUTANEOUS at 12:58

## 2021-07-03 RX ADMIN — SODIUM CHLORIDE, POTASSIUM CHLORIDE, SODIUM LACTATE AND CALCIUM CHLORIDE: 600; 310; 30; 20 INJECTION, SOLUTION INTRAVENOUS at 14:13

## 2021-07-03 RX ADMIN — FAMOTIDINE 20 MG: 10 INJECTION, SOLUTION INTRAVENOUS at 14:32

## 2021-07-03 RX ADMIN — HYDROMORPHONE HYDROCHLORIDE 0.5 MG: 1 INJECTION, SOLUTION INTRAMUSCULAR; INTRAVENOUS; SUBCUTANEOUS at 13:15

## 2021-07-03 RX ADMIN — NEOSTIGMINE METHYLSULFATE 3 MG: 1 INJECTION, SOLUTION INTRAVENOUS at 12:32

## 2021-07-03 ASSESSMENT — PULMONARY FUNCTION TESTS
PIF_VALUE: 32
PIF_VALUE: 25
PIF_VALUE: 21
PIF_VALUE: 25
PIF_VALUE: 25
PIF_VALUE: 26
PIF_VALUE: 23
PIF_VALUE: 2
PIF_VALUE: 24
PIF_VALUE: 25
PIF_VALUE: 25
PIF_VALUE: 23
PIF_VALUE: 25
PIF_VALUE: 4
PIF_VALUE: 22
PIF_VALUE: 17
PIF_VALUE: 25
PIF_VALUE: 18
PIF_VALUE: 25
PIF_VALUE: 23
PIF_VALUE: 19
PIF_VALUE: 26
PIF_VALUE: 23
PIF_VALUE: 1
PIF_VALUE: 20
PIF_VALUE: 25
PIF_VALUE: 20
PIF_VALUE: 25
PIF_VALUE: 25
PIF_VALUE: 20
PIF_VALUE: 20
PIF_VALUE: 1
PIF_VALUE: 23
PIF_VALUE: 24
PIF_VALUE: 20
PIF_VALUE: 25
PIF_VALUE: 25
PIF_VALUE: 18
PIF_VALUE: 23
PIF_VALUE: 25
PIF_VALUE: 20
PIF_VALUE: 25
PIF_VALUE: 25
PIF_VALUE: 3
PIF_VALUE: 25
PIF_VALUE: 24
PIF_VALUE: 25
PIF_VALUE: 2
PIF_VALUE: 1
PIF_VALUE: 22
PIF_VALUE: 24
PIF_VALUE: 3
PIF_VALUE: 25
PIF_VALUE: 16
PIF_VALUE: 20
PIF_VALUE: 16
PIF_VALUE: 1
PIF_VALUE: 3
PIF_VALUE: 25
PIF_VALUE: 23
PIF_VALUE: 25
PIF_VALUE: 1
PIF_VALUE: 3
PIF_VALUE: 24
PIF_VALUE: 24
PIF_VALUE: 25
PIF_VALUE: 25
PIF_VALUE: 2
PIF_VALUE: 24
PIF_VALUE: 17
PIF_VALUE: 23
PIF_VALUE: 25
PIF_VALUE: 25
PIF_VALUE: 1
PIF_VALUE: 0
PIF_VALUE: 25
PIF_VALUE: 20
PIF_VALUE: 2
PIF_VALUE: 24
PIF_VALUE: 24
PIF_VALUE: 16
PIF_VALUE: 2
PIF_VALUE: 25

## 2021-07-03 ASSESSMENT — PAIN SCALES - GENERAL
PAINLEVEL_OUTOF10: 0
PAINLEVEL_OUTOF10: 6
PAINLEVEL_OUTOF10: 7
PAINLEVEL_OUTOF10: 8
PAINLEVEL_OUTOF10: 8
PAINLEVEL_OUTOF10: 7
PAINLEVEL_OUTOF10: 4
PAINLEVEL_OUTOF10: 9
PAINLEVEL_OUTOF10: 0
PAINLEVEL_OUTOF10: 7
PAINLEVEL_OUTOF10: 0
PAINLEVEL_OUTOF10: 0
PAINLEVEL_OUTOF10: 8
PAINLEVEL_OUTOF10: 8
PAINLEVEL_OUTOF10: 7
PAINLEVEL_OUTOF10: 8
PAINLEVEL_OUTOF10: 3

## 2021-07-03 ASSESSMENT — PAIN DESCRIPTION - ONSET: ONSET: ON-GOING

## 2021-07-03 ASSESSMENT — PAIN DESCRIPTION - PAIN TYPE
TYPE: ACUTE PAIN
TYPE: SURGICAL PAIN
TYPE: SURGICAL PAIN

## 2021-07-03 ASSESSMENT — PAIN - FUNCTIONAL ASSESSMENT
PAIN_FUNCTIONAL_ASSESSMENT: ACTIVITIES ARE NOT PREVENTED

## 2021-07-03 ASSESSMENT — PAIN DESCRIPTION - LOCATION
LOCATION: ABDOMEN
LOCATION: ABDOMEN;FLANK
LOCATION: ABDOMEN;FLANK

## 2021-07-03 ASSESSMENT — PAIN DESCRIPTION - ORIENTATION
ORIENTATION: RIGHT
ORIENTATION: RIGHT

## 2021-07-03 ASSESSMENT — PAIN DESCRIPTION - DESCRIPTORS
DESCRIPTORS: PRESSURE;DISCOMFORT
DESCRIPTORS: ACHING;DISCOMFORT

## 2021-07-03 ASSESSMENT — PAIN DESCRIPTION - FREQUENCY: FREQUENCY: CONTINUOUS

## 2021-07-03 ASSESSMENT — PAIN DESCRIPTION - PROGRESSION
CLINICAL_PROGRESSION: RAPIDLY IMPROVING

## 2021-07-03 NOTE — ANESTHESIA PRE PROCEDURE
Department of Anesthesiology  Preprocedure Note       Name:  Trude Holstein   Age:  41 y.o.  :  1980                                          MRN:  [id number]         Date:  7/3/2021      Surgeon: Renee Scales):  Emil Mensah MD    Procedure: Procedure(s):  CHOLECYSTECTOMY LAPAROSCOPIC ROBOTIC XI    Medications prior to admission:   Prior to Admission medications    Medication Sig Start Date End Date Taking? Authorizing Provider   ciclopirox (PENLAC) 8 % solution Apply topically nightly. Clean affected nails with alcohol every 7 days. 21   Leeann Washington DO   triamcinolone (KENALOG) 0.1 % cream apply to affected area twice a day 21   Historical Provider, MD Manuel Walsh 1. 1.5-30 MG-MCG tablet take 1 tablet by mouth once daily as directed 21   Historical Provider, MD   azelastine (OPTIVAR) 0.05 % ophthalmic solution 1 drop 2 times daily as needed    Historical Provider, MD   CRYSELLE-28 0.3-30 MG-MCG per tablet  10/6/17   Historical Provider, MD   azelastine (ASTELIN) 0.1 % nasal spray  10/6/17   Historical Provider, MD   fluticasone [facility]) 50 MCG/ACT nasal spray  10/6/17   Historical Provider, MD       Current medications:    Current Facility-Administered Medications   Medication Dose Route Frequency Provider Last Rate Last Admin    indocyanine green (IC-GREEN) syringe 5 mg  5 mg Intravenous See Admin Instructions Emil Mensah MD        meperidine (DEMEROL) injection 12.5 mg  12.5 mg Intravenous Q5 Min PRN Trevor Burns MD        diphenhydrAMINE (BENADRYL) injection 12.5 mg  12.5 mg Intravenous Once PRN Trevor Burns MD        HYDROmorphone (DILAUDID) injection 0.5 mg  0.5 mg Intravenous Q5 Min PRN Trevor Burns MD        HYDROmorphone (DILAUDID) injection 0.25 mg  0.25 mg Intravenous Q5 Min PRN Trevor Burns MD           Allergies:     Allergies   Allergen Reactions    Codeine Other (See Comments)       Problem List:    Patient Active Problem List   Diagnosis Code    Acute cholecystitis K81.0       Past Medical History:        Diagnosis Date    Allergic rhinitis     Dr. Meg Donaldson, allergy shots q 3 weeks    Deaf     since birth, Audiology at [facility], [facility]       Past Surgical History:        Procedure Laterality Date    TONSILLECTOMY      young child       Social History:    Social History     Tobacco Use    Smoking status: Never Smoker    Smokeless tobacco: Never Used   Substance Use Topics    Alcohol use: No                                Counseling given: Not Answered      Vital Signs (Current):   Vitals:    07/03/21 0958 07/03/21 1030   BP: (!) 179/84    Pulse: 67    Resp: 16    Temp: 98.1 °F (36.7 °C)    TempSrc: Oral    SpO2: 98%    Weight:  172 lb (78 kg)   Height:  5' 6\" (1.676 m)                                              BP Readings from Last 3 Encounters:   07/03/21 (!) 179/84   07/03/21 (!) 162/90   05/26/21 (!) 141/85       NPO Status: Time of last liquid consumption: 1900                        Time of last solid consumption: 1900                        Date of last liquid consumption: 07/02/21                        Date of last solid food consumption: 07/02/21    BMI:   Wt Readings from Last 3 Encounters:   07/03/21 172 lb (78 kg)   07/03/21 172 lb (78 kg)   05/26/21 172 lb (78 kg)     Body mass index is 27.76 kg/m².     CBC:   Lab Results   Component Value Date    WBC 9.0 07/03/2021    RBC 5.31 07/03/2021    HGB 15.3 07/03/2021    HCT 46.5 07/03/2021    MCV 87.6 07/03/2021    RDW 12.9 07/03/2021     07/03/2021       CMP:   Lab Results   Component Value Date     07/03/2021    K 5.0 07/03/2021     07/03/2021    CO2 22 07/03/2021    BUN 12 07/03/2021    CREATININE 0.8 07/03/2021    GFRAA >60 07/03/2021    LABGLOM >60 07/03/2021    GLUCOSE 131 07/03/2021    PROT 7.6 07/03/2021    CALCIUM 9.5 07/03/2021    BILITOT 0.2 07/03/2021    ALKPHOS 83 07/03/2021    AST 12 07/03/2021    ALT 20 07/03/2021       POC Tests: No results for input(s): POCGLU, POCNA, POCK, POCCL, POCBUN, POCHEMO, POCHCT in the last 72 hours. Coags: No results found for: PROTIME, INR, APTT    HCG (If Applicable):   Lab Results   Component Value Date    PREGTESTUR NEGATIVE 07/03/2021        ABGs: No results found for: PHART, PO2ART, JJM6KOV, ALR3GGE, BEART, V2LWAWWP     Type & Screen (If Applicable):  No results found for: LABABO, LABRH    Drug/Infectious Status (If Applicable):  No results found for: HIV, HEPCAB    COVID-19 Screening (If Applicable): No results found for: COVID19        Anesthesia Evaluation  Patient summary reviewed no history of anesthetic complications:   Airway: Mallampati: II  TM distance: >3 FB   Neck ROM: full   Dental: normal exam         Pulmonary: breath sounds clear to auscultation                            ROS comment: Allergic rhinitis   Cardiovascular:Negative CV ROS            Rhythm: regular  Rate: normal           Beta Blocker:  Not on Beta Blocker         Neuro/Psych:                ROS comment: deaf GI/Hepatic/Renal: Neg GI/Hepatic/Renal ROS            Endo/Other: Negative Endo/Other ROS                    Abdominal:             Vascular: negative vascular ROS. Other Findings:             Anesthesia Plan      general     ASA 2 - emergent     (Sister interpreting using ASL)  Induction: intravenous. MIPS: Postoperative opioids intended and Prophylactic antiemetics administered. Anesthetic plan and risks discussed with patient and sibling. Plan discussed with CRNA.                   Toya Gray MD   7/3/2021

## 2021-07-03 NOTE — PROGRESS NOTES
Nursing Transfer Note    Data:  Summary of patients progress: general anesthesia recovery    Reason for transfer: PACU discharge criteria met, transferred to next level of care. Action:  Explained reason for transfer to Patient/Family  Report given to: rn, using RN Handoff Navigator.   Mode of transportation: Cart    Response:  RN Recommendations:continuation of care

## 2021-07-03 NOTE — OP NOTE
Operative Note     Myrna Raya  1980  [id number]       Pre-op Diagnosis:   acute cholecystitis     Post-op Diagnosis List:  acute cholecystitis       Procedure:  Procedure(s):  CHOLECYSTECTOMY LAPAROSCOPIC ROBOTIC XI     Surgeon:  Kelly Cordova MD    Staff:  Surgical Assistant: Melanie Donovan Person First: Laurelyn Najjar, LPN     Anesthesia:  General     Findings: acute cholecystitis     EBL: less than 50      Drains: None     Specimens:  ID Type Source Tests Collected by Time Destination   A : gallbladder Tissue Gallbladder SURGICAL PATHOLOGY Kelly Cordova MD 7/3/2021 0676          Complications:  * No complications entered in OR log *     Disposition of Patient:  Tolerated procedure well     [facility] Wound Closure Status:  primary     Surgical Course:  Patient is a 41 y.o. female who was diagnosed with the above. Risks, benefits, and alternatives of the procedure, including bleeding, infection, bile leak, CBD injury, possibility for open procedure/subtotal cholecystectomy, possibility for future procedures were discussed with the patient. The planned procedure was agreed to and informed consent was obtained. After informed consent was obtained patient brought to operating table placed in supine position. General anesthesia was induced. Patient is prepped and draped in usual sterile fashion. Time-out was performed identifying correct patient procedure. SCDs were on and functional.  Patient received appropriate perioperative antibiotics. Left upper quadrant 8mm incision was made and Veress needle was inserted. Pneumoperitoneum was then induced which the patient tolerated. 8mm robotic optical entry port was used to visualize the abdominal wall layers with entry into the abdomen. 8mm midline supraumblical incision was made and robotic 8mm trocar was placed under direct visualization. Two 8mm incisions were made in the RUQ.  8mm trocars were placed under direct visualization.   All ports were placed under visualization without any injury the abdominal contents. The robot was then brought up and docked to the patient. The camera and working instruments were inserted. The gallbladder was identified and grasped and elevated superiorly over the liver. It appeared grossly inflamed, distended, with pericholecystic fluid and large gallstones. Adhesions and omentum were removed from the gallbladder with blunt dissection. Cautery is used to make incision in peritoneum overlying the gallbladder. This was carried medially and laterally of the gallbladder to free the cystic plate. Firefly and Indocyanine Mk Holley was used to aid with structure identification. Blunt dissection and sparse electrocautery were used identify the cystic duct and cystic artery. These were circumferentially dissected along their course. The critical view was obtained. The cystic duct was clipped x3 proximally and x1 distally. This was divided with scissors. Cystic artery was also clipped and divided. Electrocautery and blunt dissection were used to dissect the gallbladder off the liver bed. Hemostasis ensured with electrocautery. Clips were again visualized and remained intact without any evidence of bile or bleeding. Gallbladder was placed in a bag and removed via the LUQ port site and sent for specimen. The port sites were closed with 4-0 suture in subcuticular fashion. Derma bond was applied. Patient tolerated procedure well and was transferred PACU in stable condition. All counts were correct.      Nirali Samayoa MD  07/03/21  12:30 PM

## 2021-07-03 NOTE — H&P
Historical Provider, MD   CRYSELLE-28 0.3-30 MG-MCG per tablet  10/6/17   Historical Provider, MD   azelastine (ASTELIN) 0.1 % nasal spray  10/6/17   Historical Provider, MD   fluticasone (FLONASE) 50 MCG/ACT nasal spray  10/6/17   Historical Provider, MD       Scheduled medications:   indocyanine green  5 mg Intravenous See Admin Instructions       PRN medications:     Objective:    Physical Examination:  Vitals:    07/03/21 0958 07/03/21 1030   BP: (!) 179/84    Pulse: 67    Resp: 16    Temp: 98.1 °F (36.7 °C)    TempSrc: Oral    SpO2: 98%    Weight:  172 lb (78 kg)   Height:  5' 6\" (1.676 m)       General - alert, well appearing, and in no distress  HEENT - Normocephalic, Atraumatic. No Scleral Icterus  Neck - supple, trachea midline  Respiratory - Breathing comfortably, no respiratory distress  Heart - Regular Rate  Abdomen - soft, right upper quadrant tenderness, nondistended  Neurological - alert, oriented x 3  Psych - affect normal  Musculoskeletal - moves all extremities, no gross deficit  Skin - warm, pink    Labs:    CBC  Recent Labs     07/03/21  0725   WBC 9.0   HGB 15.3   HCT 46.5        BMP  Recent Labs     07/03/21  0725      K 5.0   *   CO2 22   BUN 12   CREATININE 0.8   CALCIUM 9.5     Liver Function  Recent Labs     07/03/21  0725   LIPASE 20   BILITOT 0.2   AST 12   ALT 20   ALKPHOS 83   PROT 7.6   LABALBU 4.3     No results for input(s): LACTATE in the last 72 hours. No results for input(s): INR, PTT in the last 72 hours. Invalid input(s): PT    Imaging:    CT ABDOMEN PELVIS WO CONTRAST Additional Contrast? None    Result Date: 7/3/2021  EXAMINATION: CT OF THE ABDOMEN AND PELVIS WITHOUT CONTRAST 7/3/2021 7:19 am TECHNIQUE: CT of the abdomen and pelvis was performed without the administration of intravenous contrast. Multiplanar reformatted images are provided for review.  Dose modulation, iterative reconstruction, and/or weight based adjustment of the mA/kV was utilized to reduce the radiation dose to as low as reasonably achievable. COMPARISON: None. HISTORY: ORDERING SYSTEM PROVIDED HISTORY: RUQ pain TECHNOLOGIST PROVIDED HISTORY: Reason for exam:->RUQ pain Additional Contrast?->None Decision Support Exception - unselect if not a suspected or confirmed emergency medical condition->Emergency Medical Condition (MA) FINDINGS: Lower Chest:  Visualized portion of the lower chest demonstrates no acute abnormality. Organs: The liver, spleen, pancreas, adrenal glands and kidneys are unremarkable. There is a 2 mm nonobstructing calculus within the inferior pole of the right kidney. GI/bowel: There is a large calcified stones seen within the gallbladder neck measuring 2.1 cm x 3.4 cm. .  A 2nd stone also appears to be within the gallbladder which is noncalcified and measures approximately 2 cm. There is minimal induration seen along the medial wall of the gallbladder. These findings are favored to represent early findings of acute cholecystitis. There is no evidence of choledocholithiasis. There is no small bowel distention and there are no findings of colonic obstruction. There are no findings of inflammatory bowel disease. The appendix is visualized and is unremarkable. No evidence of acute appendicitis. Pelvis:  Bladder is unremarkable in appearance. Peritoneum/Retroperitoneum:  No evidence of retroperitoneal lymphadenopathy. The abdominal aorta is normal caliber. Bones/Soft Tissues:  No acute abnormality of the visualized osseous structures. 1. 2.1 x 3.4 cm calcified stones seen within the gallbladder neck 2. A 2nd poorly visualize noncalcified stone is seen within the gallbladder measuring approximately 2 cm. 3. There is mild induration seen along the medial wall of the gallbladder suggestive of early findings of acute cholecystitis.              ASSESSMENT & PLAN:    I have examined the patient and performed key aspects of physical exam, reviewed the record (including all pertinent and new radiology images and laboratory findings), and discussed the case with the surgical team.  I agree with the assessment and plan with the following additions, corrections, and changes. This is a 41 y.o. female admitted 7/3/2021 with cholecystitis. Plan:  1. Abx, cholecystectomy    Domo Chou MD   7/3/2021   10:45 AM    NOTE: This report, in part or full, may have been transcribed using voice recognition software. Every effort was made to ensure accuracy; however, inadvertent computerized transcription errors may be present. Please excuse any transcriptional grammatical or spelling errors that may have escaped my editorial review.

## 2021-07-03 NOTE — ANESTHESIA POSTPROCEDURE EVALUATION
Department of Anesthesiology  Postprocedure Note    Patient: Stephanie Villalta  MRN: [id number]  YOB: 1980  Date of evaluation: 7/3/2021  Time:  2:23 PM     Procedure Summary     Date: 07/03/21 Room / Location: [facility] PROCEDURE ROOM 01 / [facility]    Anesthesia Start: 1114 Anesthesia Stop: 5246    Procedure: CHOLECYSTECTOMY LAPAROSCOPIC ROBOTIC XI (N/A Abdomen) Diagnosis: (acute cholecystitis)    Surgeons: Shannon Sunshine MD Responsible Provider: Steven Hoyt MD    Anesthesia Type: general ASA Status: 2 - Emergent          Anesthesia Type: general    Keara Phase I: Keara Score: 9    Keara Phase II:      Last vitals: Reviewed and per EMR flowsheets. Anesthesia Post Evaluation    Patient location during evaluation: PACU  Patient participation: complete - patient participated  Level of consciousness: awake and alert  Airway patency: patent  Nausea & Vomiting: no nausea and no vomiting  Complications: no  Cardiovascular status: hemodynamically stable  Respiratory status: acceptable  Hydration status: euvolemic  Comments: Department of Anesthesiology  Post-Anesthesia Note    Name:  Stephanie Villalta                                         Age:  41 y.o.   MRN:  [id number]     Last Vitals:  BP (!) 171/85   Pulse 72   Temp 97.7 °F (36.5 °C) (Infrared)   Resp 16   Ht 5' 6\" (1.676 m)   Wt 172 lb (78 kg)   SpO2 93%   BMI 27.76 kg/m²   Patient Vitals in the past 4 hrs:  07/03/21 1356, BP:(!) 171/85, Temp:97.7 °F (36.5 °C), Temp src:Infrared, Pulse:72, Resp:16, SpO2:93 %  07/03/21 1330, BP:(!) 168/74, Pulse:67, Resp:18, SpO2:97 %  07/03/21 1315, BP:(!) 169/71, Temp:97.5 °F (36.4 °C), Temp src:Temporal, Pulse:75, Resp:14, SpO2:95 %  07/03/21 1300, BP:(!) 179/73, Pulse:84, Resp:16, SpO2:96 %  07/03/21 1251, BP:(!) 166/74, Temp:98.2 °F (36.8 °C), Temp src:Temporal, Pulse:88, Resp:16  07/03/21 1249, BP:(!) 166/74, Temp:98.2 °F (36.8 °C), Pulse:88, Resp:16, SpO2:98 %  07/03/21 1246, BP:(!) 166/74, Temp:98.2 °F (36.8 °C), Temp src:Temporal, Pulse:88, Resp:16, SpO2:98 %  07/03/21 1030, Height:5' 6\" (1.676 m), Weight:172 lb (78 kg)    Level of Consciousness:  Awake    Respiratory:  Stable    Oxygen Saturation:  Stable    Cardiovascular:  Stable    Hydration:  Adequate    PONV:  Stable    Post-op Pain:  Adequate analgesia    Post-op Assessment:  No apparent anesthetic complications    Additional Follow-Up / Treatment / Comment:  None    Braden Owens MD  July 3, 2021   2:23 PM

## 2021-07-03 NOTE — ED NOTES
Patient resting calmly on cart without new complaints. Family at bedside.  IVF infusing well per DO.     Laura Shi RN  07/03/21 6562

## 2021-07-04 VITALS
BODY MASS INDEX: 27.64 KG/M2 | DIASTOLIC BLOOD PRESSURE: 80 MMHG | SYSTOLIC BLOOD PRESSURE: 158 MMHG | TEMPERATURE: 97.9 F | OXYGEN SATURATION: 97 % | HEIGHT: 66 IN | RESPIRATION RATE: 16 BRPM | WEIGHT: 172 LBS | HEART RATE: 71 BPM

## 2021-07-04 PROCEDURE — 2500000003 HC RX 250 WO HCPCS: Performed by: SURGERY

## 2021-07-04 RX ORDER — DOCUSATE SODIUM 100 MG/1
100 CAPSULE, LIQUID FILLED ORAL DAILY PRN
Qty: 30 CAPSULE | Refills: 0 | Status: SHIPPED | OUTPATIENT
Start: 2021-07-04

## 2021-07-04 RX ORDER — HYDROCODONE BITARTRATE AND ACETAMINOPHEN 5; 325 MG/1; MG/1
1 TABLET ORAL EVERY 6 HOURS PRN
Qty: 28 TABLET | Refills: 0 | Status: SHIPPED | OUTPATIENT
Start: 2021-07-04 | End: 2021-07-07

## 2021-07-04 RX ADMIN — METRONIDAZOLE 500 MG: 500 INJECTION, SOLUTION INTRAVENOUS at 01:21

## 2021-07-04 ASSESSMENT — PAIN - FUNCTIONAL ASSESSMENT: PAIN_FUNCTIONAL_ASSESSMENT: ACTIVITIES ARE NOT PREVENTED

## 2021-07-04 ASSESSMENT — PAIN DESCRIPTION - PROGRESSION: CLINICAL_PROGRESSION: RAPIDLY IMPROVING

## 2021-07-04 ASSESSMENT — PAIN SCALES - GENERAL
PAINLEVEL_OUTOF10: 0
PAINLEVEL_OUTOF10: 0

## 2021-07-04 NOTE — DISCHARGE SUMMARY
[phone]   · docusate sodium 100 MG capsule     You can get these medications from any pharmacy    Bring a paper prescription for each of these medications  · HYDROcodone-acetaminophen 5-325 MG per tablet         Activity: no heavy lifting for 2 weeks  Diet: regular diet  Wound Care: keep wound clean and dry      No follow-ups on file.       Signed:  Damaris Mercado MD  7/4/2021  8:28 AM

## 2021-07-04 NOTE — PLAN OF CARE
Problem: Pain:  Goal: Pain level will decrease  Description: Pain level will decrease  7/4/2021 1037 by Farheen Dela Cruz RN  Outcome: Completed  7/3/2021 2315 by Regina Dominguez RN  Outcome: Met This Shift  Goal: Control of acute pain  Description: Control of acute pain  7/4/2021 1037 by Farheen Dela Cruz RN  Outcome: Completed  7/3/2021 2315 by Regina Dominguez RN  Outcome: Met This Shift  Goal: Control of chronic pain  Description: Control of chronic pain  7/4/2021 1037 by Farheen Dela Cruz RN  Outcome: Completed  7/3/2021 2315 by Regina Dominguez RN  Outcome: Met This Shift

## 2021-07-22 ENCOUNTER — OFFICE VISIT (OUTPATIENT)
Dept: SURGERY | Age: 41
End: 2021-07-22

## 2021-07-22 VITALS
WEIGHT: 180.5 LBS | SYSTOLIC BLOOD PRESSURE: 147 MMHG | BODY MASS INDEX: 29.01 KG/M2 | HEART RATE: 75 BPM | HEIGHT: 66 IN | RESPIRATION RATE: 18 BRPM | DIASTOLIC BLOOD PRESSURE: 83 MMHG | TEMPERATURE: 96.6 F

## 2021-07-22 DIAGNOSIS — Z90.49 STATUS POST CHOLECYSTECTOMY: Primary | ICD-10-CM

## 2021-07-22 PROCEDURE — 99024 POSTOP FOLLOW-UP VISIT: CPT | Performed by: SURGERY

## 2021-08-04 NOTE — PROGRESS NOTES
[facility] Note    Assessment/Plan:     Diagnosis Orders   1. Status post cholecystectomy      Doing well. No issues       Return if symptoms worsen or fail to improve. Chief Complaint   Patient presents with    Post-Op Check     lap uriel. the one incision is still uncomfortable. PCP: Brandon Cardenas DO    HPI: Hilda Powers is a 41 y.o. female here for follow-up of cholecystectomy. She is doing well. Her preoperative pain has resolved. Her bowels are moving. She feels much better. Her pathology showed chronic cholecystitis and focal cholesterolosis with cholelithiasis. Review of Systems    The remainder of the past medical, past surgical, family, and psychosocial history, as well as medication and allergy review, were completed and are as documented elsewhere in the chart. Objective:  Vitals:    07/22/21 1542   BP: (!) 147/83   Pulse: 75   Resp: 18   Temp: 96.6 °F (35.9 °C)   TempSrc: Temporal   Weight: 180 lb 8 oz (81.9 kg)   Height: 5' 6\" (1.676 m)          Physical Exam  Constitutional:       General: She is not in acute distress. Appearance: She is not diaphoretic. Cardiovascular:      Rate and Rhythm: Normal rate. Pulmonary:      Effort: Pulmonary effort is normal. No respiratory distress. Abdominal:      General: There is no distension. Palpations: Abdomen is soft. Tenderness: There is no abdominal tenderness. There is no guarding or rebound. Comments: Incisions clean dry and intact               Verónica Allen MD  7/22/2021    NOTE: This report, in part or full, may have been transcribed using voice recognition software. Every effort was made to ensure accuracy; however, inadvertent computerized transcription errors may be present. Please excuse any transcriptional grammatical or spelling errors that may have escaped my editorial review.       CC: Leeann Lazar DO

## 2021-12-07 ENCOUNTER — OFFICE VISIT (OUTPATIENT)
Dept: ENT CLINIC | Age: 41
End: 2021-12-07
Payer: COMMERCIAL

## 2021-12-07 VITALS
SYSTOLIC BLOOD PRESSURE: 152 MMHG | HEART RATE: 71 BPM | WEIGHT: 186 LBS | HEIGHT: 67 IN | TEMPERATURE: 97.4 F | OXYGEN SATURATION: 97 % | BODY MASS INDEX: 29.19 KG/M2 | DIASTOLIC BLOOD PRESSURE: 88 MMHG

## 2021-12-07 DIAGNOSIS — H90.5 CONGENITAL HEARING LOSS OF BOTH EARS: ICD-10-CM

## 2021-12-07 DIAGNOSIS — H61.23 BILATERAL IMPACTED CERUMEN: Primary | ICD-10-CM

## 2021-12-07 PROCEDURE — 69210 REMOVE IMPACTED EAR WAX UNI: CPT | Performed by: OTOLARYNGOLOGY

## 2021-12-07 PROCEDURE — G8484 FLU IMMUNIZE NO ADMIN: HCPCS | Performed by: OTOLARYNGOLOGY

## 2021-12-07 PROCEDURE — G8427 DOCREV CUR MEDS BY ELIG CLIN: HCPCS | Performed by: OTOLARYNGOLOGY

## 2021-12-07 PROCEDURE — G8419 CALC BMI OUT NRM PARAM NOF/U: HCPCS | Performed by: OTOLARYNGOLOGY

## 2021-12-07 PROCEDURE — 1036F TOBACCO NON-USER: CPT | Performed by: OTOLARYNGOLOGY

## 2021-12-07 ASSESSMENT — ENCOUNTER SYMPTOMS
EYE PAIN: 0
APNEA: 0
VOMITING: 0
GASTROINTESTINAL NEGATIVE: 1
ABDOMINAL PAIN: 0
COLOR CHANGE: 0
CHEST TIGHTNESS: 0
EYE DISCHARGE: 0
RESPIRATORY NEGATIVE: 1
EYES NEGATIVE: 1
DIARRHEA: 0
SHORTNESS OF BREATH: 0

## 2021-12-07 NOTE — PROGRESS NOTES
Subjective:      Patient ID:  Chalmer Gilford is a 35 y.o. female. HPI:    Cerumen Impaction  Patient presents with diminished hearing both ears for the past 6 months. There is a prior history of cerumen impaction. The patient was using ear drops to loosen wax immediately prior to this visit. Hearing Loss  Pt has had hearing loss since 10months of age. Pt signs with         Past Medical History:   Diagnosis Date    Allergic rhinitis     Dr. Carol Ann Gutiérrez, allergy shots q 3 weeks    Deaf     since birth, Audiology at [facility], [facility] [place]     Past Surgical History:   Procedure Laterality Date    CHOLECYSTECTOMY, LAPAROSCOPIC N/A 7/3/2021    CHOLECYSTECTOMY LAPAROSCOPIC ROBOTIC XI performed by Marta Bucio MD at [address] [place] [address]      young child     Family History   Problem Relation Age of Onset    Kidney Disease Maternal Grandmother     Heart Failure Maternal Grandfather     Cancer Paternal Cousin         brain tumor     Social History     Socioeconomic History    Marital status: Single     Spouse name: None    Number of children: None    Years of education: None    Highest education level: None   Occupational History    Occupation: [employer] [place] [address] [employer]   Tobacco Use    Smoking status: Never Smoker    Smokeless tobacco: Never Used   Vaping Use    Vaping Use: Never used   Substance and Sexual Activity    Alcohol use: Yes     Comment: social    Drug use: No    Sexual activity: Never   Other Topics Concern    None   Social History Narrative    None     Social Determinants of Health     Financial Resource Strain:     Difficulty of Paying Living Expenses: Not on file   Food Insecurity:     Worried About Running Out of Food in the Last Year: Not on file    Allison of Food in the Last Year: Not on file   Transportation Needs:     Lack of Transportation (Medical): Not on file    Lack of Transportation (Non-Medical):  Not on file   Physical Activity:     Days of Exercise per Week: Not on file  Minutes of Exercise per Session: Not on file   Stress:     Feeling of Stress : Not on file   Social Connections:     Frequency of Communication with Friends and Family: Not on file    Frequency of Social Gatherings with Friends and Family: Not on file    Attends Quaker Services: Not on file    Active Member of Clubs or Organizations: Not on file    Attends Club or Organization Meetings: Not on file    Marital Status: Not on file   Intimate Partner Violence:     Fear of Current or Ex-Partner: Not on file    Emotionally Abused: Not on file    Physically Abused: Not on file    Sexually Abused: Not on file   Housing Stability:     Unable to Pay for Housing in the Last Year: Not on file    Number of [place] in the Last Year: Not on file    Unstable Housing in the Last Year: Not on file     Allergies   Allergen Reactions    Codeine Other (See Comments)         Review of Systems   Constitutional: Negative. Negative for appetite change. HENT: Positive for hearing loss. Eyes: Negative. Negative for pain, discharge and visual disturbance. Respiratory: Negative. Negative for apnea, chest tightness and shortness of breath. Cardiovascular: Negative. Negative for chest pain, palpitations and leg swelling. Gastrointestinal: Negative. Negative for abdominal pain, diarrhea and vomiting. Endocrine: Negative for cold intolerance, heat intolerance and polydipsia. Genitourinary: Negative. Negative for dysuria, flank pain and hematuria. Musculoskeletal: Negative. Negative for arthralgias, gait problem and neck pain. Skin: Negative. Negative for color change, pallor and rash. Allergic/Immunologic: Negative for environmental allergies, food allergies and immunocompromised state. Neurological: Negative. Negative for dizziness, numbness and headaches. Hematological: Negative for adenopathy. Psychiatric/Behavioral: Negative. Negative for behavioral problems and hallucinations. All other systems reviewed and are negative. Objective:   Physical Exam  Vitals and nursing note reviewed. Constitutional:       Appearance: She is well-developed. HENT:      Head: Normocephalic and atraumatic. Right Ear: Decreased hearing noted. There is impacted cerumen. Left Ear: Decreased hearing noted. There is impacted cerumen. Nose: Nose normal.      Mouth/Throat:      Pharynx: Uvula midline. Eyes:      Conjunctiva/sclera: Conjunctivae normal.      Pupils: Pupils are equal, round, and reactive to light. Cardiovascular:      Rate and Rhythm: Normal rate and regular rhythm. Heart sounds: Normal heart sounds. Pulmonary:      Effort: Pulmonary effort is normal.      Breath sounds: Normal breath sounds. Abdominal:      General: Bowel sounds are normal.      Palpations: Abdomen is soft. Musculoskeletal:      Cervical back: Normal range of motion and neck supple. Skin:     General: Skin is warm and dry. Neurological:      Mental Status: She is alert and oriented to person, place, and time. Cerumenremoval     Auditory canal(s) both ears completely obstructed with cerumen. A microscope was not used . Cerumen was gently removed using soft plastic curette. Tympanic membranes are intact following the procedure. Auditory canals appear normal.                       Assessment:       Diagnosis Orders   1. Bilateral impacted cerumen     2. Congenital hearing loss of both ears                Plan:      Cerumen impaction   Will follow up with patient in 6 months unless the patient has further issues. Discussed H2O2 andirrigation bi-weekly for maintenance      Follow up in 12 month(s)                    Ashley Tai Tyrell  1980    I have discussed the case, including pertinent history and exam findings with the resident. I have seen and examined the patient and the key elements of the encounter have been performed by me.  I agree with the assessment, plan and orders as documented by the  resident              Remainder of medical problems as per  resident note. Patient seen and examined. Agree with above exam, assessment and plan.       Electronically signed by Maribeth Mckinney DO on 12/13/21 at 7:33 AM EST

## 2022-06-03 NOTE — ED PROVIDER NOTES
[address] [facility]  Department of Emergency Medicine   ED  Encounter Note  Admit Date/RoomTime: 7/3/2021  6:33 AM  ED Room:     NAME: Cy Hammans  : 1980  MRN: [id number]     Chief Complaint:  Abdominal Pain (since 4am  abd pain)    History of Present Illness        Cy Hammans is a 41 y.o. old female who presents to the emergency department by private vehicle, for sudden onset, persistent aching, burning pain in the RUQ with radiation to the right back which began 2 hour(s) prior to arrival.  There has been similar episodes in the past hx of gall stones. Since onset the symptoms have been persistent. The pain is associated with nausea and vomiting. The pain is aggravated by eating and drinking and relieved by standing. There has been NO chest pain, fever, chills, diarrhea, constipation or  symptoms. .  ROS   Pertinent positives and negatives are stated within HPI, all other systems reviewed and are negative. Past Medical History:  has a past medical history of Allergic rhinitis and Deaf. Surgical History:  has a past surgical history that includes Tonsillectomy. Social History:  reports that she has never smoked. She has never used smokeless tobacco. She reports that she does not drink alcohol and does not use drugs. Family History: family history includes Cancer in her paternal cousin; Heart Failure in her maternal grandfather; Kidney Disease in her maternal grandmother. Allergies: Codeine    Physical Exam   Oxygen Saturation Interpretation: Normal.        ED Triage Vitals [21 0634]   BP Temp Temp Source Pulse Resp SpO2 Height Weight   (!) 170/84 98.4 °F (36.9 °C) Oral 70 18 98 % 5' 6\" (1.676 m) 172 lb (78 kg)         General Appearance/Constitutional:  Alert, development consistent with age. HEENT:  NC/NT. PERRLA. Airway patent. Neck:  Supple. No lymphadenopathy.   Respiratory: Lungs Clear to auscultation and breath sounds equal.  CV:  Regular rate and rhythm. GI:  normal appearing, non-distended with no visible hernias. Bowel sounds: hypoactive bowel sounds. Tenderness: There is moderate tenderness present - located in the RUQ., Rebound tenderness is present in the RUQ., Guarding is present - located in the RUQ. Elmer Salter Liver: non-palpable. Spleen:  non-tender. Back: CVA Tenderness: No CVA tenderness. Integument:  Normal turgor. Warm, dry, without visible rash, unless noted elsewhere. Neurological:  Orientation age-appropriate. Motor functions intact.     Lab / Imaging Results   (All laboratory and radiology results have been personally reviewed by myself)  Labs:  Results for orders placed or performed during the hospital encounter of 07/03/21   CBC Auto Differential   Result Value Ref Range    WBC 9.0 4.5 - 11.5 E9/L    RBC 5.31 3.50 - 5.50 E12/L    Hemoglobin 15.3 11.5 - 15.5 g/dL    Hematocrit 46.5 34.0 - 48.0 %    MCV 87.6 80.0 - 99.9 fL    MCH 28.8 26.0 - 35.0 pg    MCHC 32.9 32.0 - 34.5 %    RDW 12.9 11.5 - 15.0 fL    Platelets 946 544 - 393 E9/L    MPV 9.5 7.0 - 12.0 fL    Neutrophils % 84.1 (H) 43.0 - 80.0 %    Immature Granulocytes % 0.2 0.0 - 5.0 %    Lymphocytes % 11.2 (L) 20.0 - 42.0 %    Monocytes % 4.0 2.0 - 12.0 %    Eosinophils % 0.2 0.0 - 6.0 %    Basophils % 0.3 0.0 - 2.0 %    Neutrophils Absolute 7.57 (H) 1.80 - 7.30 E9/L    Immature Granulocytes # 0.02 E9/L    Lymphocytes Absolute 1.01 (L) 1.50 - 4.00 E9/L    Monocytes Absolute 0.36 0.10 - 0.95 E9/L    Eosinophils Absolute 0.02 (L) 0.05 - 0.50 E9/L    Basophils Absolute 0.03 0.00 - 0.20 E9/L   Comprehensive Metabolic Panel   Result Value Ref Range    Sodium 139 132 - 146 mmol/L    Potassium 5.0 3.5 - 5.0 mmol/L    Chloride 108 (H) 98 - 107 mmol/L    CO2 22 22 - 29 mmol/L    Anion Gap 9 7 - 16 mmol/L    Glucose 131 (H) 74 - 99 mg/dL    BUN 12 6 - 20 mg/dL    CREATININE 0.8 0.5 - 1.0 mg/dL    GFR Non-African American >60 >=60 mL/min/1.73    GFR African American >60     Calcium 9.5 8.6 - 10.2 mg/dL    Total Protein 7.6 6.4 - 8.3 g/dL    Albumin 4.3 3.5 - 5.2 g/dL    Total Bilirubin 0.2 0.0 - 1.2 mg/dL    Alkaline Phosphatase 83 35 - 104 U/L    ALT 20 0 - 32 U/L    AST 12 0 - 31 U/L   Lipase   Result Value Ref Range    Lipase 20 13 - 60 U/L   Lactic Acid, Plasma   Result Value Ref Range    Lactic Acid 1.7 0.5 - 2.2 mmol/L   Urinalysis with Microscopic   Result Value Ref Range    Color, UA Yellow Straw/Yellow    Clarity, UA SL CLOUDY Clear    Glucose, Ur Negative Negative mg/dL    Bilirubin Urine Negative Negative    Ketones, Urine Negative Negative mg/dL    Specific Gravity, UA 1.025 1.005 - 1.030    Blood, Urine MODERATE (A) Negative    pH, UA 6.5 5.0 - 9.0    Protein, UA Negative Negative mg/dL    Urobilinogen, Urine 0.2 <2.0 E.U./dL    Nitrite, Urine Negative Negative    Leukocyte Esterase, Urine Negative Negative    WBC, UA 1-3 0 - 5 /HPF    RBC, UA 5-10 (A) 0 - 2 /HPF    Epithelial Cells, UA FEW /HPF    Bacteria, UA MODERATE (A) None Seen /HPF   Pregnancy, Urine   Result Value Ref Range    HCG(Urine) Pregnancy Test NEGATIVE NEGATIVE     Imaging: All Radiology results interpreted by Radiologist unless otherwise noted. CT ABDOMEN PELVIS WO CONTRAST Additional Contrast? None   Final Result   1. 2.1 x 3.4 cm calcified stones seen within the gallbladder neck   2. A 2nd poorly visualize noncalcified stone is seen within the gallbladder   measuring approximately 2 cm. 3. There is mild induration seen along the medial wall of the gallbladder   suggestive of early findings of acute cholecystitis.              ED Course / Medical Decision Making     Medications   0.9 % sodium chloride bolus (1,000 mLs Intravenous New Bag 7/3/21 0716)   fentaNYL (SUBLIMAZE) injection 50 mcg (50 mcg Intravenous Given 7/3/21 0716)   ondansetron (ZOFRAN) injection 4 mg (4 mg Intravenous Given 7/3/21 0717)   fentaNYL (SUBLIMAZE) injection 50 mcg (50 mcg Dapsone Counseling: I discussed with the patient the risks of dapsone including but not limited to hemolytic anemia, agranulocytosis, rashes, methemoglobinemia, kidney failure, peripheral neuropathy, headaches, GI upset, and liver toxicity.  Patients who start dapsone require monitoring including baseline LFTs and weekly CBCs for the first month, then every month thereafter.  The patient verbalized understanding of the proper use and possible adverse effects of dapsone.  All of the patient's questions and concerns were addressed.

## 2022-07-06 ENCOUNTER — OFFICE VISIT (OUTPATIENT)
Dept: FAMILY MEDICINE CLINIC | Age: 42
End: 2022-07-06
Payer: COMMERCIAL

## 2022-07-06 VITALS
OXYGEN SATURATION: 97 % | TEMPERATURE: 98.3 F | DIASTOLIC BLOOD PRESSURE: 89 MMHG | RESPIRATION RATE: 18 BRPM | HEART RATE: 68 BPM | SYSTOLIC BLOOD PRESSURE: 142 MMHG

## 2022-07-06 DIAGNOSIS — M25.542 ARTHRALGIA OF BOTH HANDS: ICD-10-CM

## 2022-07-06 DIAGNOSIS — R53.82 CHRONIC FATIGUE: ICD-10-CM

## 2022-07-06 DIAGNOSIS — Z00.00 ANNUAL PHYSICAL EXAM: Primary | ICD-10-CM

## 2022-07-06 DIAGNOSIS — B35.1 ONYCHOMYCOSIS: ICD-10-CM

## 2022-07-06 DIAGNOSIS — M25.541 ARTHRALGIA OF BOTH HANDS: ICD-10-CM

## 2022-07-06 PROCEDURE — 99396 PREV VISIT EST AGE 40-64: CPT | Performed by: FAMILY MEDICINE

## 2022-07-06 SDOH — ECONOMIC STABILITY: FOOD INSECURITY: WITHIN THE PAST 12 MONTHS, YOU WORRIED THAT YOUR FOOD WOULD RUN OUT BEFORE YOU GOT MONEY TO BUY MORE.: NEVER TRUE

## 2022-07-06 SDOH — ECONOMIC STABILITY: FOOD INSECURITY: WITHIN THE PAST 12 MONTHS, THE FOOD YOU BOUGHT JUST DIDN'T LAST AND YOU DIDN'T HAVE MONEY TO GET MORE.: NEVER TRUE

## 2022-07-06 ASSESSMENT — PATIENT HEALTH QUESTIONNAIRE - PHQ9
2. FEELING DOWN, DEPRESSED OR HOPELESS: 0
1. LITTLE INTEREST OR PLEASURE IN DOING THINGS: 0
SUM OF ALL RESPONSES TO PHQ QUESTIONS 1-9: 0
SUM OF ALL RESPONSES TO PHQ9 QUESTIONS 1 & 2: 0
SUM OF ALL RESPONSES TO PHQ QUESTIONS 1-9: 0

## 2022-07-06 ASSESSMENT — SOCIAL DETERMINANTS OF HEALTH (SDOH): HOW HARD IS IT FOR YOU TO PAY FOR THE VERY BASICS LIKE FOOD, HOUSING, MEDICAL CARE, AND HEATING?: NOT HARD AT ALL

## 2022-07-06 NOTE — PROGRESS NOTES
7/6/2022    Chief Complaint   Patient presents with    Annual Exam     multiple concerns       Screening Questions:    Exercise 30 minutes daily 5 times weekly   Mammogram every 1-2 years age 34, then annually after age 48: Yes   Pap smear UTD:  Yes    Fecal occult blood yearly after age 50/Colonoscopy:  No   Eye exam every 2-4 years, yearly if diabetic:  Yes    Dental exam:  Yes    BMI: There is no height or weight on file to calculate BMI. Yue Colmenares Counseled on weight loss        Labs:  No results found for: EAG  Lab Results   Component Value Date    LDLCALC 100 (H) 10/23/2017      CBC:   Lab Results   Component Value Date/Time    WBC 9.0 07/03/2021 07:25 AM    RBC 5.31 07/03/2021 07:25 AM    HGB 15.3 07/03/2021 07:25 AM    HCT 46.5 07/03/2021 07:25 AM    MCV 87.6 07/03/2021 07:25 AM    MCH 28.8 07/03/2021 07:25 AM    MCHC 32.9 07/03/2021 07:25 AM    RDW 12.9 07/03/2021 07:25 AM     07/03/2021 07:25 AM    MPV 9.5 07/03/2021 07:25 AM         Patient's past medical, surgical, social and/or family history reviewed, updated in chart, and are non-contributory (unless otherwise stated). Medications and allergies also reviewed and updated in chart.     ROS  Review of Systems - General ROS: negative for - chills, fatigue, fever, night sweats, sleep disturbance, weight gain or weight loss  Psychological ROS: negative for - anxiety, behavioral disorder, depression, hallucinations, irritability, memory difficulties, mood swings, sleep disturbances or suicidal ideation  ENT ROS: negative for - epistaxis, headaches, hearing change, nasal congestion, nasal discharge, nasal polyps, sinus pain, tinnitus, vertigo or visual changes  Hematological and Lymphatic ROS: negative for - bleeding problems, blood clots, fatigue or swollen lymph nodes  Respiratory ROS: negative for - cough, orthopnea, shortness of breath, sputum changes, tachypnea or wheezing  Cardiovascular ROS: negative for - chest pain, dyspnea on exertion, irregular heartbeat, loss of consciousness, palpitations, paroxysmal nocturnal dyspnea or rapid heart rate  Gastrointestinal ROS: negative for - abdominal pain, blood in stools, change in bowel habits, constipation, diarrhea, gas/bloating, heartburn or nausea/vomiting  Musculoskeletal ROS: negative for - joint pain, joint stiffness, joint swelling or muscle, back pain, bowel or bladder incontinence  Neurological ROS: negative for - behavioral changes, confusion, dizziness, headaches, memory loss, numbness/tingling, seizures or speech problems, weakness  Dermatological ROS: negative for - dry skin, mole changes, nail changes, pruritus, rash or skin lesion changes    Physical Exam  BP (!) 142/89   Pulse 68   Temp 98.3 °F (36.8 °C) (Temporal)   Resp 18   LMP 12/27/2017   SpO2 97%   Wt Readings from Last 3 Encounters:   12/07/21 186 lb (84.4 kg)   07/22/21 180 lb 8 oz (81.9 kg)   07/03/21 172 lb (78 kg)     Temp Readings from Last 3 Encounters:   07/06/22 98.3 °F (36.8 °C) (Temporal)   12/07/21 97.4 °F (36.3 °C) (Temporal)   07/22/21 96.6 °F (35.9 °C) (Temporal)     BP Readings from Last 3 Encounters:   07/06/22 (!) 142/89   12/07/21 (!) 152/88   07/22/21 (!) 147/83     Pulse Readings from Last 3 Encounters:   07/06/22 68   12/07/21 71   07/22/21 75       General appearance: alert, well appearing, and in no distress, oriented to person, place, and time and normal appearing weight. CVS exam: normal rate, regular rhythm, normal S1, S2, no murmurs, rubs, clicks or gallops. Radial pulses 2+ bilateral.  PT/DP pulse 2+ bilat. No C/C/E    Chest: clear to auscultation, no wheezes, rales or rhonchi, symmetric air entry. Abdomen: Soft, non-tender, non-distended, positive BS in all 4 quadrants    Extremities:Dorsalis pedis pulses palpated bilaterally, no clubbing, cyanosis, edema or erythema, Sensory exam of the foot is normal, tested with the monofilament.  Good pulses, no lesions or ulcers, good peripheral pulses. SKIN: no lesions, jaundice, petechiae, pallor, cyanosis, ecchymosis    NEURO: gross motor exam normal by observation, gait normal    Mental status - alert, oriented to person, place, and time, normal mood, behavior, speech, dress, motor activity, and thought processes      Assessment/Plan  Ashley was seen today for annual exam.    Diagnoses and all orders for this visit:    Annual physical exam  -     CBC; Future  -     Comprehensive Metabolic Panel; Future  -     Lipid Panel; Future  -     TSH; Future    Onychomycosis  -     ciclopirox (PENLAC) 8 % solution; Apply topically nightly. Clean affected nails with alcohol every 7 days. Arthralgia of both hands  -     CHLOÉ; Future  -     C-Reactive Protein; Future  -     Cyclic Citrul Peptide Antibody, IgG; Future  -     Rheumatoid Factor; Future  -     Sedimentation Rate; Future    Chronic fatigue  -     CHLOÉ; Future  -     C-Reactive Protein; Future  -     Cyclic Citrul Peptide Antibody, IgG; Future  -     Rheumatoid Factor; Future  -     Sedimentation Rate; Future        Discussed preventive care and screenings, lab results and the importance of diet and exercise. Advised to return to the office for annual exam or sooner if needed. Return in about 1 year (around 7/6/2023). Call or go to ED immediately if symptoms worsen or persist.    Educational materials and/or home exercises printed for patient's review and were included in patient instructions on his/her After Visit Summary and given to patient at the end of visit. Counseled regarding above diagnosis, including possible risks and complications,  especially if left uncontrolled. Counseled regarding the possible side effects, risks, benefits and alternatives to treatment; patient and/or guardian verbalizes understanding, agrees, feels comfortable with and wishes to proceed with above treatment plan.     Advised patient to call with any new medication issues, and read all Rx info from pharmacy

## 2022-07-07 DIAGNOSIS — Z00.00 ANNUAL PHYSICAL EXAM: ICD-10-CM

## 2022-07-07 DIAGNOSIS — M25.541 ARTHRALGIA OF BOTH HANDS: ICD-10-CM

## 2022-07-07 DIAGNOSIS — R53.82 CHRONIC FATIGUE: ICD-10-CM

## 2022-07-07 DIAGNOSIS — M25.542 ARTHRALGIA OF BOTH HANDS: ICD-10-CM

## 2022-07-07 LAB
ALBUMIN SERPL-MCNC: 4.3 G/DL (ref 3.5–5.2)
ALP BLD-CCNC: 110 U/L (ref 35–104)
ALT SERPL-CCNC: 23 U/L (ref 0–32)
ANION GAP SERPL CALCULATED.3IONS-SCNC: 12 MMOL/L (ref 7–16)
AST SERPL-CCNC: 14 U/L (ref 0–31)
BILIRUB SERPL-MCNC: 0.4 MG/DL (ref 0–1.2)
BUN BLDV-MCNC: 13 MG/DL (ref 6–20)
C-REACTIVE PROTEIN: 0.4 MG/DL (ref 0–0.4)
CALCIUM SERPL-MCNC: 9.5 MG/DL (ref 8.6–10.2)
CHLORIDE BLD-SCNC: 101 MMOL/L (ref 98–107)
CHOLESTEROL, TOTAL: 231 MG/DL (ref 0–199)
CO2: 22 MMOL/L (ref 22–29)
CREAT SERPL-MCNC: 0.8 MG/DL (ref 0.5–1)
GFR AFRICAN AMERICAN: >60
GFR NON-AFRICAN AMERICAN: >60 ML/MIN/1.73
GLUCOSE BLD-MCNC: 91 MG/DL (ref 74–99)
HCT VFR BLD CALC: 44.7 % (ref 34–48)
HDLC SERPL-MCNC: 91 MG/DL
HEMOGLOBIN: 14.6 G/DL (ref 11.5–15.5)
LDL CHOLESTEROL CALCULATED: 125 MG/DL (ref 0–99)
MCH RBC QN AUTO: 28.9 PG (ref 26–35)
MCHC RBC AUTO-ENTMCNC: 32.7 % (ref 32–34.5)
MCV RBC AUTO: 88.3 FL (ref 80–99.9)
PDW BLD-RTO: 12.9 FL (ref 11.5–15)
PLATELET # BLD: 259 E9/L (ref 130–450)
PMV BLD AUTO: 9.9 FL (ref 7–12)
POTASSIUM SERPL-SCNC: 4.1 MMOL/L (ref 3.5–5)
RBC # BLD: 5.06 E12/L (ref 3.5–5.5)
RHEUMATOID FACTOR: <10 IU/ML (ref 0–13)
SEDIMENTATION RATE, ERYTHROCYTE: 2 MM/HR (ref 0–20)
SODIUM BLD-SCNC: 135 MMOL/L (ref 132–146)
TOTAL PROTEIN: 7.4 G/DL (ref 6.4–8.3)
TRIGL SERPL-MCNC: 76 MG/DL (ref 0–149)
TSH SERPL DL<=0.05 MIU/L-ACNC: 2.19 UIU/ML (ref 0.27–4.2)
VLDLC SERPL CALC-MCNC: 15 MG/DL
WBC # BLD: 4.4 E9/L (ref 4.5–11.5)

## 2022-07-08 LAB — ANTI-NUCLEAR ANTIBODY (ANA): NEGATIVE

## 2022-07-10 LAB — CYCLIC CITRULLINATED PEPTIDE ANTIBODY IGG: 2.2 U/ML (ref 0–7)

## 2022-12-13 ENCOUNTER — OFFICE VISIT (OUTPATIENT)
Dept: ENT CLINIC | Age: 42
End: 2022-12-13
Payer: MEDICARE

## 2022-12-13 VITALS — WEIGHT: 186 LBS | BODY MASS INDEX: 29.19 KG/M2 | HEIGHT: 67 IN

## 2022-12-13 DIAGNOSIS — H61.23 BILATERAL IMPACTED CERUMEN: Primary | ICD-10-CM

## 2022-12-13 DIAGNOSIS — J30.1 SEASONAL ALLERGIC RHINITIS DUE TO POLLEN: ICD-10-CM

## 2022-12-13 DIAGNOSIS — H90.5 CONGENITAL HEARING LOSS OF BOTH EARS: ICD-10-CM

## 2022-12-13 PROCEDURE — 1036F TOBACCO NON-USER: CPT | Performed by: OTOLARYNGOLOGY

## 2022-12-13 PROCEDURE — G8419 CALC BMI OUT NRM PARAM NOF/U: HCPCS | Performed by: OTOLARYNGOLOGY

## 2022-12-13 PROCEDURE — 99213 OFFICE O/P EST LOW 20 MIN: CPT | Performed by: OTOLARYNGOLOGY

## 2022-12-13 PROCEDURE — 69210 REMOVE IMPACTED EAR WAX UNI: CPT | Performed by: OTOLARYNGOLOGY

## 2022-12-13 PROCEDURE — G8427 DOCREV CUR MEDS BY ELIG CLIN: HCPCS | Performed by: OTOLARYNGOLOGY

## 2022-12-13 PROCEDURE — G8484 FLU IMMUNIZE NO ADMIN: HCPCS | Performed by: OTOLARYNGOLOGY

## 2022-12-13 ASSESSMENT — ENCOUNTER SYMPTOMS
ABDOMINAL PAIN: 0
VOMITING: 0
SHORTNESS OF BREATH: 0
APNEA: 0
GASTROINTESTINAL NEGATIVE: 1
EYES NEGATIVE: 1
RESPIRATORY NEGATIVE: 1
CHEST TIGHTNESS: 0
EYE PAIN: 0
EYE DISCHARGE: 0
COLOR CHANGE: 0
DIARRHEA: 0

## 2022-12-13 NOTE — PROGRESS NOTES
Subjective:      Patient ID:  Galo Chiang is a 38 y.o. female. HPI:    Pt presents with a history of cerumen impaction removal.   The patients ear was last cleaned 1 year ago. The patient was not using ear drops to loosen wax immediately prior to this visit. Hearing aids: no      Past Medical History:   Diagnosis Date    Allergic rhinitis     Dr. Erika Mark, allergy shots q 3 weeks    Deaf     since birth, Audiology at [facility], [facility]     Past Surgical History:   Procedure Laterality Date    CHOLECYSTECTOMY, LAPAROSCOPIC N/A 7/3/2021    CHOLECYSTECTOMY LAPAROSCOPIC ROBOTIC XI performed by Ashely Cortes MD at [address]      young child     Family History   Problem Relation Age of Onset    Kidney Disease Maternal Grandmother     Heart Failure Maternal Grandfather     Cancer Paternal Cousin         brain tumor     Social History     Socioeconomic History    Marital status: Single     Spouse name: None    Number of children: None    Years of education: None    Highest education level: None   Occupational History    Occupation: "[employer]   Tobacco Use    Smoking status: Never    Smokeless tobacco: Never   Vaping Use    Vaping Use: Never used   Substance and Sexual Activity    Alcohol use: Yes     Comment: social    Drug use: No    Sexual activity: Never     Social Determinants of Health     Financial Resource Strain: Low Risk     Difficulty of Paying Living Expenses: Not hard at all   Food Insecurity: No Food Insecurity    Worried About Running Out of Food in the Last Year: Never true    [address] in the Last Year: Never true     Allergies   Allergen Reactions    Codeine Other (See Comments)       Review of Systems   Constitutional: Negative. Negative for appetite change. HENT:  Positive for hearing loss. Eyes: Negative. Negative for pain, discharge and visual disturbance. Respiratory: Negative. Negative for apnea, chest tightness and shortness of breath.     Cardiovascular: Negative. Negative for chest pain, palpitations and leg swelling. Gastrointestinal: Negative. Negative for abdominal pain, diarrhea and vomiting. Endocrine: Negative for cold intolerance, heat intolerance and polydipsia. Genitourinary: Negative. Negative for dysuria, flank pain and hematuria. Musculoskeletal: Negative. Negative for arthralgias, gait problem and neck pain. Skin: Negative. Negative for color change, pallor and rash. Allergic/Immunologic: Negative for environmental allergies, food allergies and immunocompromised state. Neurological: Negative. Negative for dizziness, numbness and headaches. Hematological:  Negative for adenopathy. Psychiatric/Behavioral: Negative. Negative for behavioral problems and hallucinations. All other systems reviewed and are negative. Objective: There were no vitals filed for this visit. Physical Exam  Vitals and nursing note reviewed. Constitutional:       Appearance: She is well-developed. HENT:      Head: Normocephalic and atraumatic. Right Ear: Decreased hearing noted. There is impacted cerumen. Left Ear: Decreased hearing noted. There is impacted cerumen. Nose: Nose normal.      Mouth/Throat:      Pharynx: Uvula midline. Eyes:      Conjunctiva/sclera: Conjunctivae normal.      Pupils: Pupils are equal, round, and reactive to light. Cardiovascular:      Rate and Rhythm: Normal rate and regular rhythm. Heart sounds: Normal heart sounds. Pulmonary:      Effort: Pulmonary effort is normal.      Breath sounds: Normal breath sounds. Abdominal:      General: Bowel sounds are normal.      Palpations: Abdomen is soft. Musculoskeletal:      Cervical back: Normal range of motion and neck supple. Skin:     General: Skin is warm and dry. Neurological:      Mental Status: She is alert and oriented to person, place, and time.            Cerumen removal     Auditory canal(s) both ears partially obstructed with cerumen. A microscope was not used . Cerumen was gently removed using soft plastic curette, suction. Tympanic membranes are intact following the procedure. Auditory canals appear normal.            Assessment:       Diagnosis Orders   1. Bilateral impacted cerumen        2. Congenital hearing loss of both ears        3. Seasonal allergic rhinitis due to pollen                   Plan:      Pt is getting allergy treatments for the last 27 years. Doing well. Cerumen impaction   Discussed H2O2 and irrigation bi-weekly for maintenance.     Follow up in 1 year(s)

## 2023-07-07 ASSESSMENT — PATIENT HEALTH QUESTIONNAIRE - PHQ9
2. FEELING DOWN, DEPRESSED OR HOPELESS: SEVERAL DAYS
2. FEELING DOWN, DEPRESSED OR HOPELESS: 1
SUM OF ALL RESPONSES TO PHQ QUESTIONS 1-9: 2
SUM OF ALL RESPONSES TO PHQ QUESTIONS 1-9: 2
SUM OF ALL RESPONSES TO PHQ9 QUESTIONS 1 & 2: 2
SUM OF ALL RESPONSES TO PHQ QUESTIONS 1-9: 2
1. LITTLE INTEREST OR PLEASURE IN DOING THINGS: SEVERAL DAYS
SUM OF ALL RESPONSES TO PHQ QUESTIONS 1-9: 2
SUM OF ALL RESPONSES TO PHQ9 QUESTIONS 1 & 2: 2
1. LITTLE INTEREST OR PLEASURE IN DOING THINGS: 1

## 2023-07-10 ENCOUNTER — OFFICE VISIT (OUTPATIENT)
Dept: FAMILY MEDICINE CLINIC | Age: 43
End: 2023-07-10
Payer: MEDICARE

## 2023-07-10 VITALS
RESPIRATION RATE: 16 BRPM | BODY MASS INDEX: 30.32 KG/M2 | HEIGHT: 67 IN | SYSTOLIC BLOOD PRESSURE: 120 MMHG | OXYGEN SATURATION: 98 % | DIASTOLIC BLOOD PRESSURE: 78 MMHG | HEART RATE: 69 BPM | TEMPERATURE: 97 F | WEIGHT: 193.2 LBS

## 2023-07-10 DIAGNOSIS — B35.1 ONYCHOMYCOSIS: Primary | ICD-10-CM

## 2023-07-10 DIAGNOSIS — R53.82 CHRONIC FATIGUE: ICD-10-CM

## 2023-07-10 DIAGNOSIS — E78.2 MIXED HYPERLIPIDEMIA: ICD-10-CM

## 2023-07-10 DIAGNOSIS — Z00.00 INITIAL MEDICARE ANNUAL WELLNESS VISIT: ICD-10-CM

## 2023-07-10 PROBLEM — H40.003 OPEN-ANGLE GLAUCOMA SUSPECT OF BOTH EYES: Status: ACTIVE | Noted: 2022-09-16

## 2023-07-10 LAB
ERYTHROCYTE [DISTWIDTH] IN BLOOD BY AUTOMATED COUNT: 13.5 FL (ref 11.5–15)
HCT VFR BLD AUTO: 51.5 % (ref 34–48)
HGB BLD-MCNC: 15.5 G/DL (ref 11.5–15.5)
MCH RBC QN AUTO: 28.5 PG (ref 26–35)
MCHC RBC AUTO-ENTMCNC: 30.1 % (ref 32–34.5)
MCV RBC AUTO: 94.7 FL (ref 80–99.9)
PLATELET # BLD AUTO: 283 E9/L (ref 130–450)
PMV BLD AUTO: 10 FL (ref 7–12)
RBC # BLD AUTO: 5.44 E12/L (ref 3.5–5.5)
WBC # BLD: 4.5 E9/L (ref 4.5–11.5)

## 2023-07-10 PROCEDURE — G0438 PPPS, INITIAL VISIT: HCPCS | Performed by: FAMILY MEDICINE

## 2023-07-10 RX ORDER — AZELASTINE 1 MG/ML
2 SPRAY, METERED NASAL 2 TIMES DAILY
Qty: 30 ML | Refills: 11 | Status: SHIPPED | OUTPATIENT
Start: 2023-07-10

## 2023-07-10 RX ORDER — OLOPATADINE HYDROCHLORIDE 2 MG/ML
1 SOLUTION/ DROPS OPHTHALMIC DAILY
Qty: 2.5 ML | Refills: 11 | Status: SHIPPED | OUTPATIENT
Start: 2023-07-10

## 2023-07-10 RX ORDER — TERBINAFINE HYDROCHLORIDE 250 MG/1
250 TABLET ORAL DAILY
Qty: 84 TABLET | Refills: 0 | Status: SHIPPED | OUTPATIENT
Start: 2023-07-10 | End: 2023-10-02

## 2023-07-10 RX ORDER — FLUTICASONE PROPIONATE 50 MCG
2 SPRAY, SUSPENSION (ML) NASAL DAILY
Qty: 16 G | Refills: 11 | Status: SHIPPED | OUTPATIENT
Start: 2023-07-10

## 2023-07-10 SDOH — ECONOMIC STABILITY: HOUSING INSECURITY
IN THE LAST 12 MONTHS, WAS THERE A TIME WHEN YOU DID NOT HAVE A STEADY PLACE TO SLEEP OR SLEPT IN A SHELTER (INCLUDING NOW)?: NO

## 2023-07-10 SDOH — ECONOMIC STABILITY: INCOME INSECURITY: HOW HARD IS IT FOR YOU TO PAY FOR THE VERY BASICS LIKE FOOD, HOUSING, MEDICAL CARE, AND HEATING?: NOT HARD AT ALL

## 2023-07-10 SDOH — ECONOMIC STABILITY: FOOD INSECURITY: WITHIN THE PAST 12 MONTHS, YOU WORRIED THAT YOUR FOOD WOULD RUN OUT BEFORE YOU GOT MONEY TO BUY MORE.: NEVER TRUE

## 2023-07-10 SDOH — ECONOMIC STABILITY: FOOD INSECURITY: WITHIN THE PAST 12 MONTHS, THE FOOD YOU BOUGHT JUST DIDN'T LAST AND YOU DIDN'T HAVE MONEY TO GET MORE.: NEVER TRUE

## 2023-07-10 ASSESSMENT — PATIENT HEALTH QUESTIONNAIRE - PHQ9
SUM OF ALL RESPONSES TO PHQ9 QUESTIONS 1 & 2: 2
2. FEELING DOWN, DEPRESSED OR HOPELESS: 1
SUM OF ALL RESPONSES TO PHQ QUESTIONS 1-9: 2
1. LITTLE INTEREST OR PLEASURE IN DOING THINGS: 1
SUM OF ALL RESPONSES TO PHQ QUESTIONS 1-9: 2

## 2023-07-10 ASSESSMENT — LIFESTYLE VARIABLES
HOW MANY STANDARD DRINKS CONTAINING ALCOHOL DO YOU HAVE ON A TYPICAL DAY: 1 OR 2
HOW OFTEN DO YOU HAVE A DRINK CONTAINING ALCOHOL: MONTHLY OR LESS

## 2023-07-11 LAB
ALBUMIN SERPL-MCNC: 4.7 G/DL (ref 3.5–5.2)
ALP SERPL-CCNC: 108 U/L (ref 35–104)
ALT SERPL-CCNC: 24 U/L (ref 0–32)
ANION GAP SERPL CALCULATED.3IONS-SCNC: 13 MMOL/L (ref 7–16)
AST SERPL-CCNC: 21 U/L (ref 0–31)
BILIRUB SERPL-MCNC: 0.3 MG/DL (ref 0–1.2)
BUN SERPL-MCNC: 15 MG/DL (ref 6–20)
CALCIUM SERPL-MCNC: 9.9 MG/DL (ref 8.6–10.2)
CHLORIDE SERPL-SCNC: 100 MMOL/L (ref 98–107)
CHOLESTEROL, TOTAL: 227 MG/DL (ref 0–199)
CO2 SERPL-SCNC: 22 MMOL/L (ref 22–29)
CREAT SERPL-MCNC: 0.8 MG/DL (ref 0.5–1)
FOLATE SERPL-MCNC: 11.4 NG/ML (ref 4.8–24.2)
GLUCOSE SERPL-MCNC: 83 MG/DL (ref 74–99)
HDLC SERPL-MCNC: 89 MG/DL
LDLC SERPL CALC-MCNC: 115 MG/DL (ref 0–99)
POTASSIUM SERPL-SCNC: 4.5 MMOL/L (ref 3.5–5)
PROT SERPL-MCNC: 7.9 G/DL (ref 6.4–8.3)
SODIUM SERPL-SCNC: 135 MMOL/L (ref 132–146)
TRIGL SERPL-MCNC: 114 MG/DL (ref 0–149)
VIT B12 SERPL-MCNC: 742 PG/ML (ref 211–946)
VITAMIN D 25-HYDROXY: 25 NG/ML (ref 30–100)
VLDLC SERPL CALC-MCNC: 23 MG/DL

## 2023-07-18 ENCOUNTER — PROCEDURE VISIT (OUTPATIENT)
Dept: AUDIOLOGY | Age: 43
End: 2023-07-18
Payer: MEDICARE

## 2023-07-18 DIAGNOSIS — H93.8X1 PRESSURE SENSATION IN RIGHT EAR: ICD-10-CM

## 2023-07-18 DIAGNOSIS — H90.3 SENSORINEURAL HEARING LOSS, BILATERAL: Primary | ICD-10-CM

## 2023-07-18 PROCEDURE — 92593 PR HEARING AID CHECK BINAURAL: CPT | Performed by: AUDIOLOGIST

## 2023-07-18 PROCEDURE — 92567 TYMPANOMETRY: CPT | Performed by: AUDIOLOGIST

## 2023-07-18 NOTE — PROGRESS NOTES
The patient walked in for a hearing aid check. She reported that her hearing aids are 11years old and is interested in new hearing aids. She currently only has [employer] which does not cover hearing aids. She is getting [employer] from her job soon and will check if they cover hearing aids. Tubing on the patient's hearing aids changed bilaterally and earhook changed on the right. The patient was given extra tubing and earhooks to change at home as needed. No more earhooks in stock and they were therefore ordered. She reported that she felt pressure in her right ear and requested that her ear be checked to make sure she did not have fluid behind her ears. Tympanometry preformed and revealed middle ear peak pressure and compliance within normal limits, bilaterally. These results were reviewed with the patient. No other issues noted today. She will return as needed.     Electronically signed by Jose Casas on 7/18/2023 at 2:04 PM

## 2023-12-13 ENCOUNTER — OFFICE VISIT (OUTPATIENT)
Dept: ENT CLINIC | Age: 43
End: 2023-12-13
Payer: COMMERCIAL

## 2023-12-13 VITALS
WEIGHT: 197 LBS | DIASTOLIC BLOOD PRESSURE: 81 MMHG | HEART RATE: 75 BPM | TEMPERATURE: 97.6 F | BODY MASS INDEX: 30.92 KG/M2 | OXYGEN SATURATION: 98 % | HEIGHT: 67 IN | SYSTOLIC BLOOD PRESSURE: 137 MMHG

## 2023-12-13 DIAGNOSIS — H61.23 BILATERAL IMPACTED CERUMEN: Primary | ICD-10-CM

## 2023-12-13 DIAGNOSIS — H90.5 CONGENITAL HEARING LOSS OF BOTH EARS: ICD-10-CM

## 2023-12-13 PROCEDURE — 99212 OFFICE O/P EST SF 10 MIN: CPT | Performed by: OTOLARYNGOLOGY

## 2023-12-13 NOTE — PROGRESS NOTES
Subjective:      Patient ID:  Ashley Barksdale is a 43 y.o. female.    HPI:    Pt presents with a history of cerumen impaction removal.   The patients ear was last cleaned 1 year ago.   The patient was not using ear drops to loosen wax immediately prior to this visit.      Hearing aids: yes    Pt has congenital hearin gloss and is deaf.  Able to communicate through her phone.       Past Medical History:   Diagnosis Date    Allergic rhinitis     Dr. Hannon, allergy shots q 3 weeks    Deaf     since birth, Audiology at [facility], [place]     Past Surgical History:   Procedure Laterality Date    CHOLECYSTECTOMY, LAPAROSCOPIC N/A 7/3/2021    CHOLECYSTECTOMY LAPAROSCOPIC ROBOTIC XI performed by Johnny Mancilla MD at [facility]    TONSILLECTOMY      young child     Family History   Problem Relation Age of Onset    Kidney Disease Maternal Grandmother     Heart Failure Maternal Grandfather     Cancer Paternal Cousin         brain tumor     Social History     Socioeconomic History    Marital status: Single     Spouse name: None    Number of children: None    Years of education: None    Highest education level: None   Occupational History    Occupation: [employer]   Tobacco Use    Smoking status: Never    Smokeless tobacco: Never   Vaping Use    Vaping Use: Never used   Substance and Sexual Activity    Alcohol use: Yes     Comment: social    Drug use: No    Sexual activity: Never     Social Determinants of Health     Financial Resource Strain: Low Risk  (7/10/2023)    Overall Financial Resource Strain (CARDIA)     Difficulty of Paying Living Expenses: Not hard at all   Transportation Needs: Unknown (7/10/2023)    PRAPARE - Transportation     Lack of Transportation (Non-Medical): No   Physical Activity: Insufficiently Active (7/10/2023)    Exercise Vital Sign     Days of Exercise per Week: 1 day     Minutes of Exercise per Session: 10 min   Housing Stability: Unknown (7/10/2023)    Housing Stability Vital Sign     Unstable Housing in

## 2023-12-15 LAB — PAP SMEAR, EXTERNAL: NORMAL

## 2024-03-11 ENCOUNTER — OFFICE VISIT (OUTPATIENT)
Dept: FAMILY MEDICINE CLINIC | Age: 44
End: 2024-03-11
Payer: COMMERCIAL

## 2024-03-11 VITALS
HEIGHT: 66 IN | OXYGEN SATURATION: 99 % | TEMPERATURE: 98.2 F | BODY MASS INDEX: 31.66 KG/M2 | RESPIRATION RATE: 16 BRPM | HEART RATE: 70 BPM | SYSTOLIC BLOOD PRESSURE: 120 MMHG | WEIGHT: 197 LBS | DIASTOLIC BLOOD PRESSURE: 82 MMHG

## 2024-03-11 DIAGNOSIS — E78.2 MIXED HYPERLIPIDEMIA: ICD-10-CM

## 2024-03-11 DIAGNOSIS — B35.1 ONYCHOMYCOSIS: ICD-10-CM

## 2024-03-11 DIAGNOSIS — M79.672 LEFT FOOT PAIN: ICD-10-CM

## 2024-03-11 DIAGNOSIS — L30.8 OTHER ECZEMA: Primary | ICD-10-CM

## 2024-03-11 DIAGNOSIS — E55.9 VITAMIN D DEFICIENCY: ICD-10-CM

## 2024-03-11 LAB
ALBUMIN SERPL-MCNC: 4.3 G/DL (ref 3.5–5.2)
ALP BLD-CCNC: 114 U/L (ref 35–104)
ALT SERPL-CCNC: 17 U/L (ref 0–32)
ANION GAP SERPL CALCULATED.3IONS-SCNC: 11 MMOL/L (ref 7–16)
AST SERPL-CCNC: 17 U/L (ref 0–31)
BILIRUB SERPL-MCNC: <0.2 MG/DL (ref 0–1.2)
BUN BLDV-MCNC: 12 MG/DL (ref 6–20)
CALCIUM SERPL-MCNC: 9.2 MG/DL (ref 8.6–10.2)
CHLORIDE BLD-SCNC: 104 MMOL/L (ref 98–107)
CHOLESTEROL: 180 MG/DL
CO2: 23 MMOL/L (ref 22–29)
CREAT SERPL-MCNC: 0.7 MG/DL (ref 0.5–1)
GFR SERPL CREATININE-BSD FRML MDRD: >60 ML/MIN/1.73M2
GLUCOSE BLD-MCNC: 87 MG/DL (ref 74–99)
HCT VFR BLD CALC: 44.7 % (ref 34–48)
HDLC SERPL-MCNC: 84 MG/DL
HEMOGLOBIN: 13.9 G/DL (ref 11.5–15.5)
LDL CHOLESTEROL: 82 MG/DL
MCH RBC QN AUTO: 28 PG (ref 26–35)
MCHC RBC AUTO-ENTMCNC: 31.1 G/DL (ref 32–34.5)
MCV RBC AUTO: 90.1 FL (ref 80–99.9)
PDW BLD-RTO: 13.5 % (ref 11.5–15)
PLATELET # BLD: 252 K/UL (ref 130–450)
PMV BLD AUTO: 10.1 FL (ref 7–12)
POTASSIUM SERPL-SCNC: 4 MMOL/L (ref 3.5–5)
RBC # BLD: 4.96 M/UL (ref 3.5–5.5)
SODIUM BLD-SCNC: 138 MMOL/L (ref 132–146)
TOTAL PROTEIN: 7.3 G/DL (ref 6.4–8.3)
TRIGL SERPL-MCNC: 70 MG/DL
VITAMIN D 25-HYDROXY: 25.9 NG/ML (ref 30–100)
VLDLC SERPL CALC-MCNC: 14 MG/DL
WBC # BLD: 5.2 K/UL (ref 4.5–11.5)

## 2024-03-11 PROCEDURE — 99214 OFFICE O/P EST MOD 30 MIN: CPT | Performed by: FAMILY MEDICINE

## 2024-03-11 NOTE — PROGRESS NOTES
were included in patient instructions on his/her After Visit Summary and given to patient at the end of visit.       Counseled regarding above diagnosis, including possible risks and complications,  especially if left uncontrolled.    Counseled regarding the possible side effects, risks, benefits and alternatives to treatment; patient and/or guardian verbalizes understanding, agrees, feels comfortable with and wishes to proceed with above treatment plan.    Advised patient to call with any new medication issues, and read all Rx info from pharmacy to assure aware of all possible risks and side effects of medication before taking.    Reviewed age and gender appropriate health screening exams and vaccinations.  Advised patient regarding importance of keeping up with recommended health maintenance and to schedule as soon as possible if overdue, as this is important in assessing for undiagnosed pathology, especially cancer, as well as protecting against potentially harmful/life threatening disease.      Patient and/or guardian verbalizes understanding and agrees with above counseling, assessment and plan.    All questions answered.

## 2024-03-25 ENCOUNTER — OFFICE VISIT (OUTPATIENT)
Dept: PODIATRY | Age: 44
End: 2024-03-25
Payer: COMMERCIAL

## 2024-03-25 VITALS — HEIGHT: 66 IN | BODY MASS INDEX: 31.66 KG/M2 | WEIGHT: 197 LBS

## 2024-03-25 DIAGNOSIS — M72.2 PLANTAR FASCIITIS: ICD-10-CM

## 2024-03-25 DIAGNOSIS — B35.1 ONYCHOMYCOSIS: ICD-10-CM

## 2024-03-25 DIAGNOSIS — L60.0 OC (ONYCHOCRYPTOSIS): ICD-10-CM

## 2024-03-25 DIAGNOSIS — R26.2 DIFFICULTY WALKING: ICD-10-CM

## 2024-03-25 DIAGNOSIS — M79.672 LEFT FOOT PAIN: Primary | ICD-10-CM

## 2024-03-25 PROCEDURE — 99203 OFFICE O/P NEW LOW 30 MIN: CPT | Performed by: PODIATRIST

## 2024-03-25 RX ORDER — CICLOPIROX 80 MG/ML
SOLUTION TOPICAL
Qty: 6 ML | Refills: 2 | Status: SHIPPED | OUTPATIENT
Start: 2024-03-25

## 2024-03-25 NOTE — PROGRESS NOTES
Patient is in today for evaluation of left foot pain. Patient says she was previously having pain and since getting new shoes, doing slightly better. Patient is also having a nail fungus issues to both feet. Patient says the left great toe is ingrown. Pcp is Leeann Garcia DO  Last ov 3/11/24  
visit:    Left foot pain  -     XR FOOT LEFT (MIN 3 VIEWS); Future    Plantar fasciitis    Onychomycosis  -     ciclopirox (PENLAC) 8 % solution; Apply topically daily to affected nails.    OC (onychocryptosis)    Difficulty walking        New patient evaluation and management  Radiographs were ordered 3 views left foot which were discussed with patient today.  No acute osseous abnormalities noted.  We did discuss conservative care options at this time including OTC insoles to help with the fasciitis issues.  Prescription also given today for topical Penlac to be applied to the affected dystrophic nail regions as instructed.  Patient will be followed up at a later date for continued evaluation and management.      Seen By:    Rashawn Bishop Jr, DPM    Electronically signed by Rashawn Bishop Jr, DPM on 3/25/2024 at 7:19 PM      This note was created using voice recognition software.  The note was reviewed however may contain grammatical errors.

## 2024-04-29 ENCOUNTER — OFFICE VISIT (OUTPATIENT)
Dept: PODIATRY | Age: 44
End: 2024-04-29
Payer: COMMERCIAL

## 2024-04-29 VITALS — WEIGHT: 197 LBS | HEIGHT: 66 IN | BODY MASS INDEX: 31.66 KG/M2

## 2024-04-29 DIAGNOSIS — R26.2 DIFFICULTY WALKING: ICD-10-CM

## 2024-04-29 DIAGNOSIS — B35.1 ONYCHOMYCOSIS: ICD-10-CM

## 2024-04-29 DIAGNOSIS — M72.2 PLANTAR FASCIITIS: Primary | ICD-10-CM

## 2024-04-29 PROCEDURE — 99213 OFFICE O/P EST LOW 20 MIN: CPT | Performed by: PODIATRIST

## 2024-04-29 NOTE — PROGRESS NOTES
24     Ashley Barksdale    : 1980   Sex: female    Age: 43 y.o.    Patient's PCP/Provider is:  Leeann Garcia DO    Subjective:  Patient is seen today for follow-up regarding continued care regarding fasciitis issues to both lower extremities.  Overall patient is has noticed improvement with current care and treatment.  Patient is utilizing the topical medication as instructed.  Patient has been trying to wear her good supportive shoe gear and did purchase the OTC insoles as instructed.  No other additional abnormalities noted.    Chief Complaint   Patient presents with    Nail Problem     Nail care    Foot Pain     Left foot        ROS:  Const: Positives and pertinent negatives as per HPI.    Musculo: Denies symptoms other than stated above.  Neuro: Denies symptoms other than stated above.  Skin: Denies symptoms other than stated above.    Current Medications:    Current Outpatient Medications:     ciclopirox (PENLAC) 8 % solution, Apply topically daily to affected nails., Disp: 6 mL, Rfl: 2    hydrocortisone 2.5 % cream, Apply topically 2 times daily., Disp: 28 g, Rfl: 1    azelastine (ASTELIN) 0.1 % nasal spray, 2 sprays by Nasal route 2 times daily, Disp: 30 mL, Rfl: 11    fluticasone (FLONASE) 50 MCG/ACT nasal spray, 2 sprays by Nasal route daily, Disp: 16 g, Rfl: 11    olopatadine (EYE ALLERGY ITCH RELIEF) 0.2 % SOLN ophthalmic solution, Apply 1 drop to eye daily, Disp: 2.5 mL, Rfl: 11    Allergies:  Allergies   Allergen Reactions    Codeine Other (See Comments)       Vitals:    24 0858   Weight: 89.4 kg (197 lb)   Height: 1.676 m (5' 5.98\")       Exam:  Neurovascular status unchanged.  Fasciitis issues improved to both lower extremities.  Digital nail dystrophy issues improved with current topical medication being applied as instructed.  No maceration webspaces noted bilateral foot.  Adequate range of motion ankle, subtalar joint, and MTPJ regions bilateral foot noted.  Mild improvement

## 2024-04-29 NOTE — PROGRESS NOTES
Patient is in for 1 month left foot pain and nail care. Patient says she is still having good and bad days,but doing better. Patient did get inserts for her shoes and has been wearing them as she just got them recently. Patient says they are helping so far.  Pcp is Leeann Garcia,   Last ov 3/11/24

## 2024-07-01 ENCOUNTER — OFFICE VISIT (OUTPATIENT)
Dept: PODIATRY | Age: 44
End: 2024-07-01
Payer: COMMERCIAL

## 2024-07-01 VITALS — WEIGHT: 192 LBS | BODY MASS INDEX: 31.99 KG/M2 | HEIGHT: 65 IN

## 2024-07-01 DIAGNOSIS — M72.2 PLANTAR FASCIITIS: Primary | ICD-10-CM

## 2024-07-01 DIAGNOSIS — R26.2 DIFFICULTY WALKING: ICD-10-CM

## 2024-07-01 DIAGNOSIS — B35.1 ONYCHOMYCOSIS: ICD-10-CM

## 2024-07-01 DIAGNOSIS — M79.672 LEFT FOOT PAIN: ICD-10-CM

## 2024-07-01 PROCEDURE — 99213 OFFICE O/P EST LOW 20 MIN: CPT | Performed by: PODIATRIST

## 2024-07-01 NOTE — PROGRESS NOTES
Patient here for nail care, toenail fungus. Leeann Garcia,  last visit 3/11/2024   Electronically signed by Yane Salomon LPN on 7/1/2024 at 9:14 AM

## 2024-07-01 NOTE — PROGRESS NOTES
24     Ashley Barksdale    : 1980   Sex: female    Age: 43 y.o.    Patient's PCP/Provider is:  Leeann Garcia DO    Subjective:  Patient is seen today for follow-up regarding plantar fascial issues left lower extremity.  Patient also having some chronic nail dystrophy issues to both feet.  We did converse through writing throughout the procedure and to discuss additional treatment options.  Patient denies any additional issues at this time.    Chief Complaint   Patient presents with    Nail Problem      Toenail Fungus on both feet       ROS:  Const: Positives and pertinent negatives as per HPI.    Musculo: Denies symptoms other than stated above.  Neuro: Denies symptoms other than stated above.  Skin: Denies symptoms other than stated above.    Current Medications:    Current Outpatient Medications:     ciclopirox (PENLAC) 8 % solution, Apply topically daily to affected nails., Disp: 6 mL, Rfl: 2    hydrocortisone 2.5 % cream, Apply topically 2 times daily., Disp: 28 g, Rfl: 1    azelastine (ASTELIN) 0.1 % nasal spray, 2 sprays by Nasal route 2 times daily, Disp: 30 mL, Rfl: 11    fluticasone (FLONASE) 50 MCG/ACT nasal spray, 2 sprays by Nasal route daily, Disp: 16 g, Rfl: 11    olopatadine (EYE ALLERGY ITCH RELIEF) 0.2 % SOLN ophthalmic solution, Apply 1 drop to eye daily, Disp: 2.5 mL, Rfl: 11    Allergies:  Allergies   Allergen Reactions    Codeine Other (See Comments)       Vitals:    24 0911   Weight: 87.1 kg (192 lb)   Height: 1.651 m (5' 5\")       Exam:  Neurovascular status unchanged.  Fasciitis issues  noted left lower extremity.  Tenderness noted into the plantar left heel and arch region with range of motion and muscle testing performed.  Mycotic nail issues improved to both lower extremities.  No signs of infection noted digital regions bilateral foot.  Mild antalgic gait noted upon evaluation.      Diagnostic Studies:     No results found.      Procedures:    None    Plan Per

## 2024-07-15 ENCOUNTER — OFFICE VISIT (OUTPATIENT)
Dept: FAMILY MEDICINE CLINIC | Age: 44
End: 2024-07-15
Payer: COMMERCIAL

## 2024-07-15 VITALS
DIASTOLIC BLOOD PRESSURE: 84 MMHG | OXYGEN SATURATION: 98 % | SYSTOLIC BLOOD PRESSURE: 120 MMHG | TEMPERATURE: 97.2 F | RESPIRATION RATE: 16 BRPM | WEIGHT: 195 LBS | HEIGHT: 65 IN | BODY MASS INDEX: 32.49 KG/M2 | HEART RATE: 71 BPM

## 2024-07-15 DIAGNOSIS — E55.9 VITAMIN D DEFICIENCY: ICD-10-CM

## 2024-07-15 DIAGNOSIS — Z12.31 ENCOUNTER FOR SCREENING MAMMOGRAM FOR MALIGNANT NEOPLASM OF BREAST: Primary | ICD-10-CM

## 2024-07-15 PROCEDURE — 99396 PREV VISIT EST AGE 40-64: CPT | Performed by: FAMILY MEDICINE

## 2024-07-15 NOTE — PROGRESS NOTES
non-distended, positive BS in all 4 quadrants    Extremities:Dorsalis pedis pulses palpated bilaterally, no clubbing, cyanosis, edema or erythema     SKIN: no lesions, jaundice, petechiae, pallor, cyanosis, ecchymosis    NEURO: gross motor exam normal by observation, gait normal      Assessment/Plan  There are no diagnoses linked to this encounter.      No follow-ups on file.      Leeann Lazar, DO    Call or go to ED immediately if symptoms worsen or persist.    Educational materials and/or home exercises printed for patient's review and were included in patient instructions on his/her After Visit Summary and given to patient at the end of visit.       Counseled regarding above diagnosis, including possible risks and complications,  especially if left uncontrolled.    Counseled regarding the possible side effects, risks, benefits and alternatives to treatment; patient and/or guardian verbalizes understanding, agrees, feels comfortable with and wishes to proceed with above treatment plan.    Advised patient to call with any new medication issues, and read all Rx info from pharmacy to assure aware of all possible risks and side effects of medication before taking.    Reviewed age and gender appropriate health screening exams and vaccinations.  Advised patient regarding importance of keeping up with recommended health maintenance and to schedule as soon as possible if overdue, as this is important in assessing for undiagnosed pathology, especially cancer, as well as protecting against potentially harmful/life threatening disease.      Patient and/or guardian verbalizes understanding and agrees with above counseling, assessment and plan.    All questions answered.

## 2024-07-25 ENCOUNTER — TELEPHONE (OUTPATIENT)
Dept: FAMILY MEDICINE CLINIC | Age: 44
End: 2024-07-25

## 2024-07-25 NOTE — TELEPHONE ENCOUNTER
Patient came into the office and wanted to know if she could have a letter excusing her from jury duty, is it okay to generate letter?

## 2024-08-06 ENCOUNTER — TELEPHONE (OUTPATIENT)
Dept: AUDIOLOGY | Age: 44
End: 2024-08-06

## 2024-08-06 NOTE — TELEPHONE ENCOUNTER
Patient's sister called regarding scheduling a HA check.  Called back and left a return vmail.   [phone]  Electronically signed by Jose Covarrubias on 8/6/2024 at 4:44 PM

## 2024-08-07 ENCOUNTER — TELEPHONE (OUTPATIENT)
Dept: AUDIOLOGY | Age: 44
End: 2024-08-07

## 2024-08-07 NOTE — TELEPHONE ENCOUNTER
Patient sister Judith called to schedule patient for hearing aid check. Patient is soraya for 8/15/24 1030 am arrival for 11 am appt    Patient and family interested in new hearing aids.  Told sister to call insurance and check if she has any benefits prior to appt.     Patient sister coming to appointment. Asked if iPad  was okay but Judith stated that patient would want sister to interpret - explained will have to sign a waiver form at appointment.     Will let audiologist scheduled to see her know of appt and above info.     Jose Patton/CCC-A  [place] Lic # [id number]

## 2024-08-15 ENCOUNTER — HOSPITAL ENCOUNTER (OUTPATIENT)
Dept: AUDIOLOGY | Age: 44
Discharge: HOME | End: 2024-08-15

## 2024-08-15 PROCEDURE — 9990000010 HC NO CHARGE VISIT

## 2024-08-15 NOTE — PROGRESS NOTES
Patient was seen to have hearing aid tubing changed accompanied by her sister. Changed tubing and re measured ears. Patient was interested in new technology. Recommended she contact insurance to determine if she has hearing aid benefits. Also recommended she contact Dr. Lopez's office to obtain a referral for a hearing evaluation, as we will need an updated test prior to purchasing new hearing aids. Patient and sister agreed to plan of action and will return for services PRN.      Electronically signed by Jose Harrell on 8/15/2024 at 11:26 AM

## 2024-09-06 ENCOUNTER — OFFICE VISIT (OUTPATIENT)
Dept: PODIATRY | Age: 44
End: 2024-09-06
Payer: COMMERCIAL

## 2024-09-06 VITALS — HEIGHT: 65 IN | BODY MASS INDEX: 32.49 KG/M2 | WEIGHT: 195 LBS

## 2024-09-06 DIAGNOSIS — R26.2 DIFFICULTY WALKING: ICD-10-CM

## 2024-09-06 DIAGNOSIS — M72.2 PLANTAR FASCIITIS: Primary | ICD-10-CM

## 2024-09-06 DIAGNOSIS — B35.1 ONYCHOMYCOSIS: ICD-10-CM

## 2024-09-06 PROCEDURE — 99213 OFFICE O/P EST LOW 20 MIN: CPT | Performed by: PODIATRIST

## 2024-09-06 NOTE — PROGRESS NOTES
24     Ashley Barksdale    : 1980   Sex: female    Age: 43 y.o.    Patient's PCP/Provider is:  Leeann Garcia DO    Subjective:  Patient is seen today for follow-up regarding continued care regarding plantar fascial issues and mycotic nail issues.  Patient has noticed improvement with the digital nail issues and current topical medication being applied.  No other additional abnormalities noted at this time.    Chief Complaint   Patient presents with    Foot Pain     Heel pain    Nail Problem     Nail fungus        ROS:  Const: Positives and pertinent negatives as per HPI.    Musculo: Denies symptoms other than stated above.  Neuro: Denies symptoms other than stated above.  Skin: Denies symptoms other than stated above.    Current Medications:    Current Outpatient Medications:     diclofenac sodium (VOLTAREN) 1 % GEL, Apply 4 g topically 4 times daily, Disp: 100 g, Rfl: 4    ciclopirox (PENLAC) 8 % solution, Apply topically daily to affected nails., Disp: 6 mL, Rfl: 2    hydrocortisone 2.5 % cream, Apply topically 2 times daily., Disp: 28 g, Rfl: 1    azelastine (ASTELIN) 0.1 % nasal spray, 2 sprays by Nasal route 2 times daily, Disp: 30 mL, Rfl: 11    fluticasone (FLONASE) 50 MCG/ACT nasal spray, 2 sprays by Nasal route daily, Disp: 16 g, Rfl: 11    olopatadine (EYE ALLERGY ITCH RELIEF) 0.2 % SOLN ophthalmic solution, Apply 1 drop to eye daily, Disp: 2.5 mL, Rfl: 11    Allergies:  Allergies   Allergen Reactions    Codeine Other (See Comments)       Vitals:    24 0942   Weight: 88.5 kg (195 lb)   Height: 1.651 m (5' 5\")       Exam:  Neurovascular status unchanged.  Fasciitis issues stable right lower extremity.  No edema or ecchymotic skin changes noted to both lower extremities.  Mycotic nail issues improved to both feet.  No maceration webspaces noted bilateral foot.  Adequate range of motion noted digital regions bilateral foot.  Improved gait noted upon evaluation.      Diagnostic Studies:

## 2024-09-06 NOTE — PROGRESS NOTES
Patient is in today for 2 month nail fungus follow up. Patient says she is also getting pain in both heels since returning to work this past week. Pcp is Leeann Garcia,   Last ov 7/15/24

## 2024-09-13 ENCOUNTER — TELEPHONE (OUTPATIENT)
Dept: ENT CLINIC | Age: 44
End: 2024-09-13

## 2024-09-16 ENCOUNTER — TELEPHONE (OUTPATIENT)
Dept: AUDIOLOGY | Age: 44
End: 2024-09-16

## 2024-10-08 ENCOUNTER — PROCEDURE VISIT (OUTPATIENT)
Dept: AUDIOLOGY | Age: 44
End: 2024-10-08
Payer: COMMERCIAL

## 2024-10-08 DIAGNOSIS — H90.3 SENSORINEURAL HEARING LOSS, BILATERAL: Primary | ICD-10-CM

## 2024-10-08 PROCEDURE — 92567 TYMPANOMETRY: CPT

## 2024-10-08 PROCEDURE — 92557 COMPREHENSIVE HEARING TEST: CPT

## 2024-10-09 NOTE — PROGRESS NOTES
AUDIOMETRIC EVALUATION    REASON FOR REFERRAL:  This patient was referred for audiometric testing by Leeann Garcia DO  due to established hearing loss.      *iPad  used during appointment.     RESULTS:  Pure tone audiometric testing using earphones  was carried out. Results revealed severe to profound sensorineural hearing loss, bilaterally. A speech awareness lowest response level was obtained at 75 dBHL in the right ear, and 80 dBHL in the left ear. Speech discrimination testing could not be tested. Masked Bone Conduction Testing revealed thresholds consistent with air conduction thresholds, bilaterally.  Tympanometry revealed normal middle ear peak pressure and compliance, bilaterally.     IMPRESSION:  Today's results revealed a severe to profound sensorineural hearing loss, bilaterally. Speech reception thresholds were in good agreement with the pure tone averages, bilaterally.  Speech discrimination testing could not be completed. Tympanometry suggests middle ear function within normal limits, bilaterally.       A re-evaluation is recommended annually.     The above results were reviewed with the patient.     If I can be of further assistance or provide additional information, please do not hesitate to contact this office.    Patient is interested in getting new hearing aids. Patient is going to call insurance and see where hearing aid benefits are covered. HÉCTOR signed and hearing test given to patient.       Thank you for the referral.        ___________________________________  Jose Holm/CCC-GAIL  [facility] [id number]  Electronically signed by Jose Holm on 10/9/2024 at 11:59 AM

## 2024-10-25 ENCOUNTER — TELEPHONE (OUTPATIENT)
Dept: AUDIOLOGY | Age: 44
End: 2024-10-25

## 2024-10-25 NOTE — TELEPHONE ENCOUNTER
Patients sister called and left message that she would like a call back to discuss next step for hearing aids.   Called and left a message stating that I was not here for appointment so sorry if I am repeating info already given but that we always recommend calling insurance and seeing if have hearing aid benefits and if you do, asking where you can go to utilize the benefits as sometimes insurance dictates where you can go.  Told her to call back next week and we could further discuss next steps.     Idania Echavarria, Jose/CCC-GAIL  [place] Lic # [id number]

## 2024-10-30 ENCOUNTER — TELEPHONE (OUTPATIENT)
Dept: AUDIOLOGY | Age: 44
End: 2024-10-30

## 2024-10-30 NOTE — TELEPHONE ENCOUNTER
Patient sister Yolie called and stated that she checked with insurance and she has no coverage.  She stated that patient wants same hearing aids , same company, and hypoallergenic molds.   Stated that I would check with the audiologist who sees patients in the hospital to see if still working with the  [employer] who patient got previous hearing aids through.      Quoted entry level prive of about $2200 for the pair.       Will reach back out to sister with answer if we can do [employer] in the hospital or if we will need to go with a different company.

## 2024-11-06 ENCOUNTER — TELEPHONE (OUTPATIENT)
Dept: AUDIOLOGY | Age: 44
End: 2024-11-06

## 2024-11-06 NOTE — TELEPHONE ENCOUNTER
Called and spoke with patient's sister who handles her scheduling. Scheduled for a hearing aid eval 11/12 1030 am on the 7th floor in [facility]     Told to arrive at 10am to register in the lobby.     Patient being seen for KANG and pick options for hearing aids.     Discussed with sister that we service [employer] hearing aids but we do not currently have updated software to fit new.  We have found better performance and warranty with different companies.     Patient needs hypoallergenic molds for ears.

## 2024-11-11 ENCOUNTER — OFFICE VISIT (OUTPATIENT)
Dept: PODIATRY | Age: 44
End: 2024-11-11
Payer: COMMERCIAL

## 2024-11-11 ENCOUNTER — OFFICE VISIT (OUTPATIENT)
Dept: FAMILY MEDICINE CLINIC | Age: 44
End: 2024-11-11
Payer: COMMERCIAL

## 2024-11-11 VITALS
OXYGEN SATURATION: 98 % | HEIGHT: 65 IN | SYSTOLIC BLOOD PRESSURE: 149 MMHG | WEIGHT: 195 LBS | HEART RATE: 71 BPM | BODY MASS INDEX: 32.49 KG/M2 | TEMPERATURE: 97.1 F | DIASTOLIC BLOOD PRESSURE: 87 MMHG

## 2024-11-11 DIAGNOSIS — B35.1 ONYCHOMYCOSIS: Primary | ICD-10-CM

## 2024-11-11 DIAGNOSIS — M72.2 PLANTAR FASCIITIS: ICD-10-CM

## 2024-11-11 DIAGNOSIS — L30.8 OTHER ECZEMA: ICD-10-CM

## 2024-11-11 DIAGNOSIS — M25.512 ACUTE PAIN OF LEFT SHOULDER: Primary | ICD-10-CM

## 2024-11-11 DIAGNOSIS — R26.2 DIFFICULTY WALKING: ICD-10-CM

## 2024-11-11 PROCEDURE — 99213 OFFICE O/P EST LOW 20 MIN: CPT | Performed by: FAMILY MEDICINE

## 2024-11-11 PROCEDURE — 99213 OFFICE O/P EST LOW 20 MIN: CPT | Performed by: PODIATRIST

## 2024-11-11 RX ORDER — HYDROCORTISONE 25 MG/G
CREAM TOPICAL
Qty: 28 G | Refills: 1 | Status: SHIPPED | OUTPATIENT
Start: 2024-11-11

## 2024-11-11 RX ORDER — NORETHINDRONE ACETATE AND ETHINYL ESTRADIOL 1MG-20(21)
1 KIT ORAL DAILY
COMMUNITY

## 2024-11-11 NOTE — PROGRESS NOTES
24     Ashley Barksdale    : 1980   Sex: female    Age: 43 y.o.    Patient's PCP/Provider is:  Leeann Garcia DO    Subjective:  Patient is seen today for follow-up regarding continued care regarding plantar fascial issues nail dystrophy issues bilateral foot.  Patient has been applying the topical medications as instructed.  No other additional abnormalities noted at this time.    Chief Complaint   Patient presents with    Foot Pain     Bilateral foot     Nail Problem     Nail fungus        ROS:  Const: Positives and pertinent negatives as per HPI.    Musculo: Denies symptoms other than stated above.  Neuro: Denies symptoms other than stated above.  Skin: Denies symptoms other than stated above.    Current Medications:    Current Outpatient Medications:     diclofenac sodium (VOLTAREN) 1 % GEL, Apply 4 g topically 4 times daily, Disp: 100 g, Rfl: 4    ciclopirox (PENLAC) 8 % solution, Apply topically daily to affected nails., Disp: 6 mL, Rfl: 2    hydrocortisone 2.5 % cream, Apply topically 2 times daily., Disp: 28 g, Rfl: 1    azelastine (ASTELIN) 0.1 % nasal spray, 2 sprays by Nasal route 2 times daily, Disp: 30 mL, Rfl: 11    fluticasone (FLONASE) 50 MCG/ACT nasal spray, 2 sprays by Nasal route daily, Disp: 16 g, Rfl: 11    olopatadine (EYE ALLERGY ITCH RELIEF) 0.2 % SOLN ophthalmic solution, Apply 1 drop to eye daily, Disp: 2.5 mL, Rfl: 11    Allergies:  Allergies   Allergen Reactions    Codeine Other (See Comments)       Vitals:    24 1000   BP: (!) 146/83   Pulse: 72   Temp: 97.1 °F (36.2 °C)   SpO2: 98%   Weight: 88.5 kg (195 lb)   Height: 1.651 m (5' 5\")       Exam:  Neurovascular status unchanged.  Mycotic nail issues improved digital regions bilateral foot.  Fasciitis issues stable to both lower extremities.  No edema or ecchymotic skin changes noted bilateral foot.  No plantar callosities or heel fissuring noted bilateral foot.      Diagnostic Studies:     No results

## 2024-11-11 NOTE — PROGRESS NOTES
Patient is in today for 2 month nail fungus follow up and bilateral foot pain. Patient says she is having pain in both heels and on the side of the left foot. Patient says she is on her feet all day at work. Pcp is Leeann Garcia,   Last ov 7/15/24
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
## 2024-11-12 ENCOUNTER — HOSPITAL ENCOUNTER (OUTPATIENT)
Dept: AUDIOLOGY | Age: 44
Discharge: HOME | End: 2024-11-12

## 2024-11-12 PROCEDURE — 9990000010 HC NO CHARGE VISIT: Performed by: AUDIOLOGIST

## 2024-11-12 NOTE — PROGRESS NOTES
Patient here today for HAE and ear mold impressions.     She would like BTE with battery, dark grey. Due to power restrictions, the only rechargeable is RITE and she does not want that.     She also requested skeleton or canal hypoallergenic molds in soft material.     Will order and call her sister, Yolie, when hearing aids are in.     Patti Villagomez M.A., CCC/A  [place] Lic [id number]  Electronically signed by Jose Avila on 11/12/2024 at 3:08 PM

## 2024-11-13 VITALS
HEART RATE: 73 BPM | BODY MASS INDEX: 32.02 KG/M2 | TEMPERATURE: 97.9 F | SYSTOLIC BLOOD PRESSURE: 138 MMHG | RESPIRATION RATE: 18 BRPM | OXYGEN SATURATION: 98 % | WEIGHT: 192.2 LBS | DIASTOLIC BLOOD PRESSURE: 88 MMHG | HEIGHT: 65 IN

## 2024-11-22 ENCOUNTER — FOLLOWUP TELEPHONE ENCOUNTER (OUTPATIENT)
Dept: AUDIOLOGY | Age: 44
End: 2024-11-22

## 2024-11-22 NOTE — TELEPHONE ENCOUNTER
Ordered [employer] Rechargeable SP hearing aids, order number [id number]      Ear molds already on order.     Call when in to schedule fitting.     Patti Villagomez M.A., CCC/A  [place] Lic [id number]  Electronically signed by Jose Avila on 11/22/2024 at 12:51 PM

## 2025-01-03 ENCOUNTER — FOLLOWUP TELEPHONE ENCOUNTER (OUTPATIENT)
Dept: AUDIOLOGY | Age: 45
End: 2025-01-03

## 2025-01-03 NOTE — TELEPHONE ENCOUNTER
Called to schedule HA fitting. Father answered home phone and asked I contact the other number. There was no answer.     Called Yolie, who I have spoken to in the past, and left voice message to call to schedule ROOT fitting.     Patti Villagomez M.A., CCC/A  [place] Lic [id number]  Electronically signed by Jose Avila on 1/3/2025 at 1:31 PM

## 2025-01-13 ENCOUNTER — OFFICE VISIT (OUTPATIENT)
Dept: PODIATRY | Age: 45
End: 2025-01-13
Payer: COMMERCIAL

## 2025-01-13 VITALS — HEIGHT: 65 IN | BODY MASS INDEX: 31.99 KG/M2 | WEIGHT: 192 LBS

## 2025-01-13 DIAGNOSIS — L60.0 OC (ONYCHOCRYPTOSIS): ICD-10-CM

## 2025-01-13 DIAGNOSIS — B35.1 ONYCHOMYCOSIS: Primary | ICD-10-CM

## 2025-01-13 PROCEDURE — 99213 OFFICE O/P EST LOW 20 MIN: CPT | Performed by: PODIATRIST

## 2025-01-13 NOTE — PROGRESS NOTES
Patient here for nail care. Patient states she occasionally has oozing from left great toe.  Leeann Garcia,  last visit 11/11/2024  Electronically signed by Yane Salomon LPN on 1/13/2025 at 8:53 AM    
foot.  No maceration webspaces noted bilateral foot.  No plantar callosities or heel fissuring noted bilateral foot.  Adequate range of motion noted digital regions bilateral foot.      Diagnostic Studies:     No results found.      Procedures:    None    Plan Per Assessment  Ashley was seen today for nail problem.    Diagnoses and all orders for this visit:    Onychomycosis    OC (onychocryptosis)      Evaluation and management  Debridement dystrophic/ingrown nails performed bilateral foot to patient tolerance.  We did discuss appropriate footcare options with patient in detail today.  Patient was advised on continued use topical medication to the affected digital nails.  Patient will be followed up at a later date for continued evaluation and management.  She was advised to call the office with any questions or concerns in the interim.      Seen By:    Rashawn Bishop Jr, DPM    Electronically signed by Rashawn Bishop Jr, DPM on 1/13/2025 at 9:04 AM    This note was created using voice recognition software.  The note was reviewed however may contain grammatical errors.

## 2025-01-16 ENCOUNTER — HOSPITAL ENCOUNTER (OUTPATIENT)
Dept: AUDIOLOGY | Age: 45
Discharge: HOME | End: 2025-01-16

## 2025-01-16 PROCEDURE — 9990000010 HC NO CHARGE VISIT: Performed by: AUDIOLOGIST

## 2025-02-21 ENCOUNTER — HOSPITAL ENCOUNTER (OUTPATIENT)
Dept: AUDIOLOGY | Age: 45
Discharge: HOME | End: 2025-02-21

## 2025-02-21 PROCEDURE — V5140 BEHIND EAR BINAUR HEARING AI: HCPCS | Performed by: AUDIOLOGIST

## 2025-02-21 PROCEDURE — V5160 DISPENSING FEE BINAURAL: HCPCS | Performed by: AUDIOLOGIST

## 2025-02-21 NOTE — PROGRESS NOTES
The patient came in for a 30 day hearing aid follow up.  Patient reported they are doing well with the hearing aids. and they have chosen to keep the them.  She reports they are better than her old aids and she loves that she can stream music from her phone.      Changes to hearing aids  today: increased gain because she always turns up first thing in morning. Also, she could not turn off aids manually. Demonstrated how to use VC to turn off and also increased system sounds from soft to very loud so that she can hear the HA alerts.     Counseled patient on: returning as needed.     Patient is satisfied with the hearing aids and billing will be done today as a self-pay patient.  [facility], patient has agreed upon payment and understands. Patient to return as needed.    BILLED BIN [employer] HEARING AID AND DISP FEE    Patti Villagomez M.A., CCC/A  [place] Lic [id number]  Electronically signed by Jose Avila on 2/21/2025 at 9:35 AM

## 2025-03-17 ENCOUNTER — OFFICE VISIT (OUTPATIENT)
Dept: PODIATRY | Age: 45
End: 2025-03-17
Payer: COMMERCIAL

## 2025-03-17 VITALS — BODY MASS INDEX: 31.99 KG/M2 | HEIGHT: 65 IN | WEIGHT: 192 LBS

## 2025-03-17 DIAGNOSIS — B35.1 ONYCHOMYCOSIS: Primary | ICD-10-CM

## 2025-03-17 DIAGNOSIS — M72.2 PLANTAR FASCIITIS: ICD-10-CM

## 2025-03-17 DIAGNOSIS — L60.0 OC (ONYCHOCRYPTOSIS): ICD-10-CM

## 2025-03-17 PROCEDURE — 99213 OFFICE O/P EST LOW 20 MIN: CPT | Performed by: PODIATRIST

## 2025-03-17 NOTE — PROGRESS NOTES
Patient is in today for 2 month nail fungus follow up. Patient says the pain she was previously having in her feet has moved up closer to her toes.  Pcp is Leeann Garcia,   Last ov 11/11/24

## 2025-03-17 NOTE — PROGRESS NOTES
3/17/25     Ashley Barksdale    : 1980   Sex: female    Age: 44 y.o.    Patient's PCP/Provider is:  Leeann Garcia DO    Subjective:  Patient is seen today for follow-up regarding foot evaluation regarding chronic nail dystrophy issues bilateral foot.  The patient stated for fasciitis issues have improved with use of her current shoe gear selection as well.  No other additional abnormalities noted at this time.    Chief Complaint   Patient presents with    Nail Problem     Nail fungus        ROS:  Const: Positives and pertinent negatives as per HPI.    Musculo: Denies symptoms other than stated above.  Neuro: Denies symptoms other than stated above.  Skin: Denies symptoms other than stated above.    Current Medications:    Current Outpatient Medications:     hydrocortisone 2.5 % cream, Apply topically 2 times daily., Disp: 28 g, Rfl: 1    diclofenac sodium (VOLTAREN) 1 % GEL, Apply 4 g topically 4 times daily, Disp: 100 g, Rfl: 4    ciclopirox (PENLAC) 8 % solution, Apply topically daily to affected nails., Disp: 6 mL, Rfl: 2    azelastine (ASTELIN) 0.1 % nasal spray, 2 sprays by Nasal route 2 times daily, Disp: 30 mL, Rfl: 11    fluticasone (FLONASE) 50 MCG/ACT nasal spray, 2 sprays by Nasal route daily, Disp: 16 g, Rfl: 11    olopatadine (EYE ALLERGY ITCH RELIEF) 0.2 % SOLN ophthalmic solution, Apply 1 drop to eye daily, Disp: 2.5 mL, Rfl: 11    norethindrone-ethinyl estradiol (JUNEL FE 1/20) 1-20 MG-MCG per tablet, Take 1 tablet by mouth daily (Patient not taking: Reported on 3/17/2025), Disp: , Rfl:     Allergies:  Allergies   Allergen Reactions    Codeine Other (See Comments)       Vitals:    25 0909   Weight: 87.1 kg (192 lb)   Height: 1.651 m (5' 5\")       Exam:  Neurovascular status unchanged.  Ingrown issues stable bilateral foot.  Nail dystrophy issues improved with current topical medications being utilized.  Plantar fascial issues improved with daily activities and shoe

## 2025-05-19 ENCOUNTER — OFFICE VISIT (OUTPATIENT)
Dept: PODIATRY | Age: 45
End: 2025-05-19
Payer: COMMERCIAL

## 2025-05-19 VITALS — HEIGHT: 65 IN | BODY MASS INDEX: 31.99 KG/M2 | WEIGHT: 192 LBS

## 2025-05-19 DIAGNOSIS — L60.0 OC (ONYCHOCRYPTOSIS): ICD-10-CM

## 2025-05-19 DIAGNOSIS — B35.1 ONYCHOMYCOSIS: Primary | ICD-10-CM

## 2025-05-19 PROCEDURE — 99213 OFFICE O/P EST LOW 20 MIN: CPT | Performed by: PODIATRIST

## 2025-05-19 RX ORDER — NYSTATIN AND TRIAMCINOLONE ACETONIDE 100000; 1 [USP'U]/G; MG/G
CREAM TOPICAL
Qty: 60 G | Refills: 3 | Status: SHIPPED | OUTPATIENT
Start: 2025-05-19

## 2025-05-19 NOTE — PROGRESS NOTES
Patient is in today for 2 month nail fungus. Patient says a few weeks ago she picked off a layer of her toenail and a fluid came out from underneath on the left great toe. Patient says that same toenail also split in the middle and had to be trimmed as the nail was poking. Pcp is Leeann Lazar,   Last ov 11/11/24

## 2025-05-19 NOTE — PROGRESS NOTES
25     Ashley Barksdale    : 1980   Sex: female    Age: 44 y.o.    Patient's PCP/Provider is:  Leeann Lazar DO    Subjective:  Patient is seen today for follow-up regarding continued care regarding nail dystrophy issues left great toe region.  Patient has been utilizing the topical medications as instructed.  No other additional abnormalities noted at this time.  Patient is pleased with care provided.    Chief Complaint   Patient presents with    Nail Problem     Nail fungus        ROS:  Const: Positives and pertinent negatives as per HPI.    Musculo: Denies symptoms other than stated above.  Neuro: Denies symptoms other than stated above.  Skin: Denies symptoms other than stated above.    Current Medications:    Current Outpatient Medications:     nystatin-triamcinolone (MYCOLOG II) 014312-2.1 UNIT/GM-% cream, Apply topically 2 times daily., Disp: 60 g, Rfl: 3    hydrocortisone 2.5 % cream, Apply topically 2 times daily., Disp: 28 g, Rfl: 1    diclofenac sodium (VOLTAREN) 1 % GEL, Apply 4 g topically 4 times daily, Disp: 100 g, Rfl: 4    ciclopirox (PENLAC) 8 % solution, Apply topically daily to affected nails., Disp: 6 mL, Rfl: 2    azelastine (ASTELIN) 0.1 % nasal spray, 2 sprays by Nasal route 2 times daily, Disp: 30 mL, Rfl: 11    fluticasone (FLONASE) 50 MCG/ACT nasal spray, 2 sprays by Nasal route daily, Disp: 16 g, Rfl: 11    olopatadine (EYE ALLERGY ITCH RELIEF) 0.2 % SOLN ophthalmic solution, Apply 1 drop to eye daily, Disp: 2.5 mL, Rfl: 11    norethindrone-ethinyl estradiol (JUNEL FE 1/20) 1-20 MG-MCG per tablet, Take 1 tablet by mouth daily (Patient not taking: Reported on 2025), Disp: , Rfl:     Allergies:  Allergies   Allergen Reactions    Codeine Other (See Comments)       Vitals:    25 0951   Weight: 87.1 kg (192 lb)   Height: 1.651 m (5' 5\")       Exam:  Neurovascular status grossly intact.  Left great toenail improved proximal two thirds of the nailbed region.  No

## 2025-07-14 DIAGNOSIS — Z12.31 ENCOUNTER FOR SCREENING MAMMOGRAM FOR MALIGNANT NEOPLASM OF BREAST: ICD-10-CM

## 2025-07-14 LAB
ALBUMIN: 4.2 G/DL (ref 3.5–5.2)
ALP BLD-CCNC: 112 U/L (ref 35–104)
ALT SERPL-CCNC: 22 U/L (ref 0–35)
ANION GAP SERPL CALCULATED.3IONS-SCNC: 10 MMOL/L (ref 7–16)
AST SERPL-CCNC: 23 U/L (ref 0–35)
BILIRUB SERPL-MCNC: 0.4 MG/DL (ref 0–1.2)
BUN BLDV-MCNC: 16 MG/DL (ref 6–20)
CALCIUM SERPL-MCNC: 9.4 MG/DL (ref 8.6–10)
CHLORIDE BLD-SCNC: 104 MMOL/L (ref 98–107)
CHOLESTEROL, TOTAL: 220 MG/DL
CO2: 22 MMOL/L (ref 22–29)
CREAT SERPL-MCNC: 0.8 MG/DL (ref 0.5–1)
GFR, ESTIMATED: >90 ML/MIN/1.73M2
GLUCOSE BLD-MCNC: 87 MG/DL (ref 74–99)
HCT VFR BLD CALC: 43.5 % (ref 34–48)
HDLC SERPL-MCNC: 96 MG/DL
HEMOGLOBIN: 14.4 G/DL (ref 11.5–15.5)
LDL CHOLESTEROL: 111 MG/DL
MCH RBC QN AUTO: 28.5 PG (ref 26–35)
MCHC RBC AUTO-ENTMCNC: 33.1 G/DL (ref 32–34.5)
MCV RBC AUTO: 86 FL (ref 80–99.9)
PDW BLD-RTO: 13.5 % (ref 11.5–15)
PLATELET # BLD: 286 K/UL (ref 130–450)
PMV BLD AUTO: 9.4 FL (ref 7–12)
POTASSIUM SERPL-SCNC: 4.2 MMOL/L (ref 3.5–5.1)
RBC # BLD: 5.06 M/UL (ref 3.5–5.5)
SODIUM BLD-SCNC: 136 MMOL/L (ref 136–145)
TOTAL PROTEIN: 7.2 G/DL (ref 6.4–8.3)
TRIGL SERPL-MCNC: 67 MG/DL
TSH SERPL DL<=0.05 MIU/L-ACNC: 2.11 UIU/ML (ref 0.27–4.2)
VLDLC SERPL CALC-MCNC: 13 MG/DL
WBC # BLD: 5.3 K/UL (ref 4.5–11.5)

## 2025-07-17 ENCOUNTER — OFFICE VISIT (OUTPATIENT)
Dept: FAMILY MEDICINE CLINIC | Age: 45
End: 2025-07-17
Payer: COMMERCIAL

## 2025-07-17 VITALS
HEIGHT: 65 IN | HEART RATE: 62 BPM | WEIGHT: 198.3 LBS | OXYGEN SATURATION: 98 % | RESPIRATION RATE: 16 BRPM | DIASTOLIC BLOOD PRESSURE: 82 MMHG | SYSTOLIC BLOOD PRESSURE: 128 MMHG | TEMPERATURE: 97.9 F | BODY MASS INDEX: 33.04 KG/M2

## 2025-07-17 DIAGNOSIS — Z00.00 ENCOUNTER FOR WELL ADULT EXAM WITHOUT ABNORMAL FINDINGS: Primary | ICD-10-CM

## 2025-07-17 PROCEDURE — 99396 PREV VISIT EST AGE 40-64: CPT | Performed by: FAMILY MEDICINE

## 2025-07-17 RX ORDER — OLOPATADINE HYDROCHLORIDE 2 MG/ML
1 SOLUTION OPHTHALMIC DAILY
Qty: 2.5 ML | Refills: 11 | Status: SHIPPED | OUTPATIENT
Start: 2025-07-17

## 2025-07-17 SDOH — ECONOMIC STABILITY: FOOD INSECURITY: WITHIN THE PAST 12 MONTHS, YOU WORRIED THAT YOUR FOOD WOULD RUN OUT BEFORE YOU GOT MONEY TO BUY MORE.: NEVER TRUE

## 2025-07-17 SDOH — ECONOMIC STABILITY: FOOD INSECURITY: WITHIN THE PAST 12 MONTHS, THE FOOD YOU BOUGHT JUST DIDN'T LAST AND YOU DIDN'T HAVE MONEY TO GET MORE.: NEVER TRUE

## 2025-07-17 ASSESSMENT — PATIENT HEALTH QUESTIONNAIRE - PHQ9
SUM OF ALL RESPONSES TO PHQ QUESTIONS 1-9: 0
1. LITTLE INTEREST OR PLEASURE IN DOING THINGS: NOT AT ALL
SUM OF ALL RESPONSES TO PHQ QUESTIONS 1-9: 0
2. FEELING DOWN, DEPRESSED OR HOPELESS: NOT AT ALL
SUM OF ALL RESPONSES TO PHQ QUESTIONS 1-9: 0
SUM OF ALL RESPONSES TO PHQ QUESTIONS 1-9: 0

## 2025-07-17 NOTE — PROGRESS NOTES
Well Adult Note  Name: Ashley Barksdale Today’s Date: 2025   MRN: [id number] Sex: Female   Age: 44 y.o. Ethnicity: Non- / Non    : 1980 Race: White (non-)      Ashley Barksdale is here for a well adult exam.          Assessment & Plan      Encounter for well adult exam without abnormal findings  -     Lipid Panel; Future  -     Comprehensive Metabolic Panel; Future  -     TSH without Reflex; Future  -     CBC with Auto Differential; Future  Results         Return in 1 year (on 2026) for CPE (Physical Exam).     Subjective   History of Present Illness        Review of Systems       Allergies   Allergen Reactions    Codeine Other (See Comments)     Prior to Visit Medications    Medication Sig Taking? Authorizing Provider   nystatin-triamcinolone (MYCOLOG II) 466493-2.1 UNIT/GM-% cream Apply topically 2 times daily. Yes Rashawn Bishop Jr., DPM   hydrocortisone 2.5 % cream Apply topically 2 times daily. Yes Leeann Lazar DO   diclofenac sodium (VOLTAREN) 1 % GEL Apply 4 g topically 4 times daily Yes Rashawn Bishop Jr., DPM   ciclopirox (PENLAC) 8 % solution Apply topically daily to affected nails. Yes Rashawn Bishop Jr., DPM   azelastine (ASTELIN) 0.1 % nasal spray 2 sprays by Nasal route 2 times daily Yes Leeann Lazar DO   fluticasone (FLONASE) 50 MCG/ACT nasal spray 2 sprays by Nasal route daily Yes Leeann Lazar DO   olopatadine (EYE ALLERGY ITCH RELIEF) 0.2 % SOLN ophthalmic solution Apply 1 drop to eye daily Yes Leeann Lazar DO   norethindrone-ethinyl estradiol (JUNEL FE 1/20) 1-20 MG-MCG per tablet Take 1 tablet by mouth daily  Patient not taking: Reported on 2025  Provider, Maria M, MD     Past Medical History:   Diagnosis Date    Allergic rhinitis     Dr. Hannon, allergy shots q 3 weeks    Deaf     [facility]     Past Surgical History:   Procedure Laterality Date    CHOLECYSTECTOMY,

## 2025-07-17 NOTE — PROGRESS NOTES
2025    Ashley Barksdale (:  1980) is a 44 y.o. female, is here for evaluation of the following chief complaint(s):  Annual Exam (Has concerns with having low estrogen was advised at her last OBGYN appt that she may be pre-menopausal /), Hip Pain (Has been experiencing pain in the left hip area ), and Discuss Labs (2025)      Screening Questions:   Exercise 30 minutes daily 5 times weekly  Mammogram every 1-2 years age 40, then annually after age 50:  Yes  Pap smear UTD:  Yes   Fecal occult blood yearly after age 50/Colonoscopy:  No  Eye exam every 2-4 years, yearly if diabetic:  Yes   Dental exam:  Yes   BMI: Body mass index   History of Present Illness      Patient's past medical, surgical, social and/or family history reviewed, updated in chart, and are non-contributory (unless otherwise stated).  Medications and allergies also reviewed and updated in chart.     ROS negative unless otherwise specified    Physical Exam  Temp Readings from Last 3 Encounters:   25 97.9 °F (36.6 °C) (Temporal)   24 97.9 °F (36.6 °C)   24 97.1 °F (36.2 °C)     Wt Readings from Last 3 Encounters:   25 89.9 kg (198 lb 4.8 oz)   25 87.1 kg (192 lb)   25 87.1 kg (192 lb)     BP Readings from Last 3 Encounters:   25 128/82   24 138/88   24 (!) 149/87     Pulse Readings from Last 3 Encounters:   25 62   24 73   24 71       General appearance: alert, well appearing, and in no distress, oriented to person, place, and time and normal appearing weight.    CVS exam: normal rate, regular rhythm, normal S1, S2, no murmurs, rubs, clicks or gallops.  Radial pulses 2+ bilateral.  PT/DP pulse 2+ bilat.  No C/C/E    Chest: clear to auscultation, no wheezes, rales or rhonchi, symmetric air entry.     Abdomen: Soft, non-tender, non-distended, positive BS in all 4 quadrants    Extremities:Dorsalis pedis pulses palpated bilaterally, no clubbing, cyanosis, edema or

## 2025-07-21 ENCOUNTER — OFFICE VISIT (OUTPATIENT)
Dept: PODIATRY | Age: 45
End: 2025-07-21
Payer: COMMERCIAL

## 2025-07-21 VITALS — WEIGHT: 198 LBS | BODY MASS INDEX: 32.95 KG/M2

## 2025-07-21 DIAGNOSIS — R26.2 DIFFICULTY WALKING: ICD-10-CM

## 2025-07-21 DIAGNOSIS — B35.1 ONYCHOMYCOSIS: Primary | ICD-10-CM

## 2025-07-21 DIAGNOSIS — L60.0 OC (ONYCHOCRYPTOSIS): ICD-10-CM

## 2025-07-21 DIAGNOSIS — M72.2 PLANTAR FASCIITIS: ICD-10-CM

## 2025-07-21 PROCEDURE — 99213 OFFICE O/P EST LOW 20 MIN: CPT | Performed by: PODIATRIST

## 2025-07-21 NOTE — PROGRESS NOTES
25     Ashley Barksdale    : 1980   Sex: female    Age: 44 y.o.    Patient's PCP/Provider is:  Leeann Lazar DO    Subjective:  Patient is seen today for follow-up regarding continued care regarding nail dystrophy issues, ingrown issues, and fasciitis issues.  Overall patient is doing better with all conditions being evaluated and treated.  Patient has noted significant improvement in digital nail growth.  No other abnormalities noted.    Chief Complaint   Patient presents with    Nail Problem       ROS:  Const: Positives and pertinent negatives as per HPI.    Musculo: Denies symptoms other than stated above.  Neuro: Denies symptoms other than stated above.  Skin: Denies symptoms other than stated above.    Current Medications:    Current Outpatient Medications:     olopatadine (EYE ALLERGY ITCH RELIEF) 0.2 % SOLN ophthalmic solution, Apply 1 drop to eye daily, Disp: 2.5 mL, Rfl: 11    nystatin-triamcinolone (MYCOLOG II) 869017-1.1 UNIT/GM-% cream, Apply topically 2 times daily., Disp: 60 g, Rfl: 3    norethindrone-ethinyl estradiol (JUNEL FE 1/20) 1-20 MG-MCG per tablet, Take 1 tablet by mouth daily, Disp: , Rfl:     hydrocortisone 2.5 % cream, Apply topically 2 times daily., Disp: 28 g, Rfl: 1    diclofenac sodium (VOLTAREN) 1 % GEL, Apply 4 g topically 4 times daily, Disp: 100 g, Rfl: 4    ciclopirox (PENLAC) 8 % solution, Apply topically daily to affected nails., Disp: 6 mL, Rfl: 2    azelastine (ASTELIN) 0.1 % nasal spray, 2 sprays by Nasal route 2 times daily, Disp: 30 mL, Rfl: 11    fluticasone (FLONASE) 50 MCG/ACT nasal spray, 2 sprays by Nasal route daily, Disp: 16 g, Rfl: 11    Allergies:  Allergies   Allergen Reactions    Codeine Other (See Comments)       Vitals:    25 1009   Weight: 89.8 kg (198 lb)       Exam:  Neurovascular status unchanged.  Mycotic nail issues/ingrown issues improve left great toe.  Fasciitis issues improved to both lower extremities.  Adequate range of

## (undated) DEVICE — GLOVE SURG SZ 7 L12IN FNGR THK94MIL TRNSLUC YEL LTX HYDRGEL

## (undated) DEVICE — INTENDED FOR TISSUE SEPARATION, AND OTHER PROCEDURES THAT REQUIRE A SHARP SURGICAL BLADE TO PUNCTURE OR CUT.: Brand: BARD-PARKER ® STAINLESS STEEL BLADES

## (undated) DEVICE — KIT,ANTI FOG,W/SPONGE & FLUID,SOFT PACK: Brand: MEDLINE

## (undated) DEVICE — INSUFFLATION NEEDLE TO ESTABLISH PNEUMOPERITONEUM.: Brand: INSUFFLATION NEEDLE

## (undated) DEVICE — TOWEL,OR,DSP,ST,BLUE,STD,6/PK,12PK/CS: Brand: MEDLINE

## (undated) DEVICE — ARM DRAPE

## (undated) DEVICE — NEEDLE CLOSURE OMNICLOSE

## (undated) DEVICE — SURGICEL ENDOSCP APPL

## (undated) DEVICE — TRAY 0 DEG STRYKER 5MM/10MM LENS REUSABLE

## (undated) DEVICE — TISSUE RETRIEVAL SYSTEM: Brand: INZII RETRIEVAL SYSTEM

## (undated) DEVICE — PACK PROCEDURE SURG GEN CUST

## (undated) DEVICE — CANNULA SEAL

## (undated) DEVICE — DRAPE,LAP,CHOLE,W/TROUGHS,STERILE: Brand: MEDLINE

## (undated) DEVICE — NEEDLE HYPO 25GA L1.5IN BLU POLYPR HUB S STL REG BVL STR

## (undated) DEVICE — ADHESIVE SKIN CLSR 0.7ML TOP DERMBND ADV

## (undated) DEVICE — FORCE BIPOLAR: Brand: ENDOWRIST

## (undated) DEVICE — SOLUTION IV IRRIG POUR BRL 0.9% SODIUM CHL 2F7124

## (undated) DEVICE — PROGRASP FORCEPS: Brand: ENDOWRIST

## (undated) DEVICE — CHLORAPREP 26ML ORANGE

## (undated) DEVICE — INSUFFLATION TUBING SET WITH FILTER, FUNNEL CONNECTOR AND LUER LOCK: Brand: JOSNOE MEDICAL INC

## (undated) DEVICE — INSTRUMENT CLAMP TOWEL LARGE REUSABLE

## (undated) DEVICE — SUCTION IRRIGATOR: Brand: ENDOWRIST

## (undated) DEVICE — SCISSORS SURG DIA8MM MPLR CRV ENDOWRIST

## (undated) DEVICE — DOUBLE BASIN SET: Brand: MEDLINE INDUSTRIES, INC.

## (undated) DEVICE — BLADELESS OBTURATOR: Brand: WECK VISTA

## (undated) DEVICE — MEDIUM-LARGE CLIP APPLIER: Brand: ENDOWRIST

## (undated) DEVICE — COLUMN DRAPE

## (undated) DEVICE — CAMERA STRYKER 1488

## (undated) DEVICE — CLIP INT M L POLYMER LOK LIG HEM O LOK

## (undated) DEVICE — ELECTRODE PT RET AD L9FT HI MOIST COND ADH HYDRGEL CORDED

## (undated) DEVICE — AGENT HEMSTAT 3GM OXIDIZED REGENERATED CELOS ABSRB FOR CONT (ORDER MULTIPLES OF 5EA)

## (undated) DEVICE — TIP COVER ACCESSORY

## (undated) DEVICE — SYRINGE 20ML LL S/C 50

## (undated) DEVICE — WARMER SCP 2 ANTIFOG LAP DISP